# Patient Record
Sex: FEMALE | Race: WHITE | Employment: PART TIME | URBAN - METROPOLITAN AREA
[De-identification: names, ages, dates, MRNs, and addresses within clinical notes are randomized per-mention and may not be internally consistent; named-entity substitution may affect disease eponyms.]

---

## 2017-12-07 ENCOUNTER — OFFICE VISIT (OUTPATIENT)
Dept: FAMILY MEDICINE CLINIC | Facility: CLINIC | Age: 62
End: 2017-12-07

## 2017-12-07 VITALS
BODY MASS INDEX: 29.16 KG/M2 | SYSTOLIC BLOOD PRESSURE: 124 MMHG | OXYGEN SATURATION: 99 % | HEART RATE: 86 BPM | TEMPERATURE: 98 F | DIASTOLIC BLOOD PRESSURE: 82 MMHG | HEIGHT: 65 IN | RESPIRATION RATE: 16 BRPM | WEIGHT: 175 LBS

## 2017-12-07 DIAGNOSIS — H61.23 BILATERAL IMPACTED CERUMEN: ICD-10-CM

## 2017-12-07 DIAGNOSIS — F17.200 CURRENT SMOKER: ICD-10-CM

## 2017-12-07 DIAGNOSIS — R05.8 POST-VIRAL COUGH SYNDROME: Primary | ICD-10-CM

## 2017-12-07 PROCEDURE — 99202 OFFICE O/P NEW SF 15 MIN: CPT | Performed by: NURSE PRACTITIONER

## 2017-12-07 RX ORDER — DEXTROMETHORPHAN HYDROBROMIDE AND PROMETHAZINE HYDROCHLORIDE 15; 6.25 MG/5ML; MG/5ML
5 SYRUP ORAL 3 TIMES DAILY PRN
Qty: 120 ML | Refills: 0 | Status: SHIPPED | OUTPATIENT
Start: 2017-12-07

## 2017-12-07 NOTE — PROGRESS NOTES
CHIEF COMPLAINT:   Patient presents with:  Sinus Problem  Cough        HPI:   Ferdinand Molina is a 65year old female who presents for cough  for  2-3  days. Cough described as dry, worse at night. Cough is interfering with sleep.  Trigger for the cough: l SKIN: no rashes,no suspicious lesions  HEAD: atraumatic, normocephalic,  + mild tenderness on palpation of bilateral maxillary and ethmoid sinuses  EYES: conjunctiva clear, EOM intact  EARS: TM's occluded by cerumen bilaterally.   NOSE: nostrils patent, pur Everyone has had a cough as part of the common cold, flu, or bronchitis. This kind of cough occurs along with an achy feeling, low-grade fever, nasal and sinus congestion, and a scratchy or sore throat. This usually gets better in 2 to 3 weeks.  A cough tanner Cough may be the only sign of mild asthma. You may have tests to find out if asthma is causing your cough. You may also take asthma medicine on a trial basis.   Acid reflux (heartburn, GERD)  The esophagus is the tube that carries food from the mouth to the How to Quit Smoking  Smoking is one of the hardest habits to break. About half of all people who have ever smoked have been able to quit. Most people who still smoke want to quit. Here are some of the best ways to stop smoking.     Keep trying  Most smokers After reviewing your smoking patterns and past attempts to quit, your doctor may offer a prescription medicine such as bupropion, varenicline, a nicotine inhaler, or nasal spray. Each has advantages and side effects. Your doctor can review these with you.

## 2017-12-07 NOTE — PATIENT INSTRUCTIONS
Cough most likely due to post nasal drip and sinus infection. Warm salt water gargles before bedtime. Chronic Cough with Uncertain Cause (Adult)    Everyone has had a cough as part of the common cold, flu, or bronchitis.  This kind of cough occurs along · Beta-blockers for high blood pressure and other conditions. These include propranolol, atenolol, metoprolol, nadolol, and others. Let your healthcare provider know if you are taking any of these. Asthma  Cough may be the only sign of mild asthma.  You m © 7822-2584 [employer] [address] [facility], [facility] [place]. All rights reserved. This information is not intended as a substitute for professional medical care. Always follow your healthcare professional's instructions.         How to Nicotine replacement therapy may make quitting easier. Certain aids, such as the nicotine patch, gum, and lozenges, are available without a prescription. It is best to use these under a doctor’s care, though.  The skin patch provides a steady supply of paul © 4323-5758 [employer] [address]. [address] [facility], [employer] [place]. All rights reserved. This information is not intended as a substitute for professional medical care. Always follow your healthcare professional's instructions.

## 2023-05-15 ENCOUNTER — OFFICE VISIT (OUTPATIENT)
Dept: INTERNAL MEDICINE CLINIC | Facility: CLINIC | Age: 68
End: 2023-05-15
Payer: MEDICARE

## 2023-05-15 VITALS
HEIGHT: 65 IN | RESPIRATION RATE: 17 BRPM | HEART RATE: 86 BPM | DIASTOLIC BLOOD PRESSURE: 80 MMHG | OXYGEN SATURATION: 97 % | BODY MASS INDEX: 27.49 KG/M2 | WEIGHT: 165 LBS | SYSTOLIC BLOOD PRESSURE: 116 MMHG

## 2023-05-15 DIAGNOSIS — F41.1 GENERALIZED ANXIETY DISORDER: ICD-10-CM

## 2023-05-15 DIAGNOSIS — H61.23 BILATERAL IMPACTED CERUMEN: ICD-10-CM

## 2023-05-15 DIAGNOSIS — R06.09 DYSPNEA ON EXERTION: Primary | ICD-10-CM

## 2023-05-15 DIAGNOSIS — F17.210 CIGARETTE SMOKER: ICD-10-CM

## 2023-05-15 DIAGNOSIS — Z00.00 HEALTH MAINTENANCE EXAMINATION: ICD-10-CM

## 2023-05-15 DIAGNOSIS — F41.0 PANIC ATTACKS: ICD-10-CM

## 2023-05-15 PROBLEM — J41.0 SMOKERS' COUGH (HCC): Chronic | Status: ACTIVE | Noted: 2023-05-15

## 2023-05-15 LAB
BASOPHILS # BLD AUTO: 0.05 X10(3) UL (ref 0–0.2)
BASOPHILS NFR BLD AUTO: 0.8 %
DEPRECATED RDW RBC AUTO: 44.1 FL (ref 35.1–46.3)
EOSINOPHIL # BLD AUTO: 0.06 X10(3) UL (ref 0–0.7)
EOSINOPHIL NFR BLD AUTO: 0.9 %
ERYTHROCYTE [DISTWIDTH] IN BLOOD BY AUTOMATED COUNT: 12.5 % (ref 11–15)
HCT VFR BLD AUTO: 51.5 %
HGB BLD-MCNC: 17 G/DL
IMM GRANULOCYTES # BLD AUTO: 0.03 X10(3) UL (ref 0–1)
IMM GRANULOCYTES NFR BLD: 0.5 %
LYMPHOCYTES # BLD AUTO: 2.4 X10(3) UL (ref 1–4)
LYMPHOCYTES NFR BLD AUTO: 36 %
MCH RBC QN AUTO: 31.3 PG (ref 26–34)
MCHC RBC AUTO-ENTMCNC: 33 G/DL (ref 31–37)
MCV RBC AUTO: 94.8 FL
MONOCYTES # BLD AUTO: 0.46 X10(3) UL (ref 0.1–1)
MONOCYTES NFR BLD AUTO: 6.9 %
NEUTROPHILS # BLD AUTO: 3.66 X10 (3) UL (ref 1.5–7.7)
NEUTROPHILS # BLD AUTO: 3.66 X10(3) UL (ref 1.5–7.7)
NEUTROPHILS NFR BLD AUTO: 54.9 %
PLATELET # BLD AUTO: 275 10(3)UL (ref 150–450)
RBC # BLD AUTO: 5.43 X10(6)UL
WBC # BLD AUTO: 6.7 X10(3) UL (ref 4–11)

## 2023-05-15 PROCEDURE — 86803 HEPATITIS C AB TEST: CPT | Performed by: FAMILY MEDICINE

## 2023-05-15 PROCEDURE — 83036 HEMOGLOBIN GLYCOSYLATED A1C: CPT | Performed by: FAMILY MEDICINE

## 2023-05-15 PROCEDURE — 80061 LIPID PANEL: CPT | Performed by: FAMILY MEDICINE

## 2023-05-15 PROCEDURE — 84443 ASSAY THYROID STIM HORMONE: CPT | Performed by: FAMILY MEDICINE

## 2023-05-15 PROCEDURE — 80053 COMPREHEN METABOLIC PANEL: CPT | Performed by: FAMILY MEDICINE

## 2023-05-15 PROCEDURE — 83880 ASSAY OF NATRIURETIC PEPTIDE: CPT | Performed by: FAMILY MEDICINE

## 2023-05-15 PROCEDURE — 85025 COMPLETE CBC W/AUTO DIFF WBC: CPT | Performed by: FAMILY MEDICINE

## 2023-05-15 RX ORDER — ALPRAZOLAM 0.5 MG/1
0.5 TABLET ORAL NIGHTLY PRN
Qty: 5 TABLET | Refills: 0 | Status: SHIPPED | OUTPATIENT
Start: 2023-05-15

## 2023-05-15 NOTE — ASSESSMENT & PLAN NOTE
Smoking cessation advised. Prevnar 20 vaccine today. We will benefit from CT chest moving forward- I will plan to order in the future.

## 2023-05-15 NOTE — ASSESSMENT & PLAN NOTE
Patient with generalized anxiety disorder. Also intermittent panic attacks and phobias. She is overall controlling her anxiety but does note that she might be interested in long-term management of anxiety with medications. She will think about this and get back to me in 2 weeks  In the meantime alprazolam 0.5 mg to use as needed #5 tablets prescribed to use only for panic attacks as needed.   Check labs including CBC, CMP, TSH.

## 2023-05-15 NOTE — ASSESSMENT & PLAN NOTE
Bilateral cerumen impaction. Start with trial of Debrox over-the-counter drops  If without improvement consider ear irrigation during follow-up visit.

## 2023-05-15 NOTE — ASSESSMENT & PLAN NOTE
Patient with dyspnea on exertion, ongoing for 1 year. Also does have some shortness of breath with lying down. Also with history of anxiety. She does not quite feel wheezy but has significant smoking history. Lungs with decreased air movement but no wheezing. I will check pulmonary function testing on her. Also check chest x-ray. I will also order labs including CBC, CMP, TSH, proBNP. I will also check a nuclear stress test-she reports that she is unable to walk far distances. Consider echo. Follow-up in 2 weeks.

## 2023-05-16 PROBLEM — R73.03 PREDIABETES: Status: ACTIVE | Noted: 2023-05-16

## 2023-05-16 PROBLEM — D58.2 ELEVATED HEMOGLOBIN (HCC): Status: ACTIVE | Noted: 2023-05-16

## 2023-05-16 PROBLEM — D58.2 ELEVATED HEMOGLOBIN: Status: ACTIVE | Noted: 2023-05-16

## 2023-05-16 PROBLEM — E78.00 PURE HYPERCHOLESTEROLEMIA: Status: ACTIVE | Noted: 2023-05-16

## 2023-05-16 LAB
ALBUMIN SERPL-MCNC: 4.1 G/DL (ref 3.4–5)
ALBUMIN/GLOB SERPL: 1.1 {RATIO} (ref 1–2)
ALP LIVER SERPL-CCNC: 84 U/L
ALT SERPL-CCNC: 23 U/L
ANION GAP SERPL CALC-SCNC: 5 MMOL/L (ref 0–18)
AST SERPL-CCNC: 23 U/L (ref 15–37)
BILIRUB SERPL-MCNC: 0.9 MG/DL (ref 0.1–2)
BUN BLD-MCNC: 15 MG/DL (ref 7–18)
BUN/CREAT SERPL: 23.8 (ref 10–20)
CALCIUM BLD-MCNC: 9.6 MG/DL (ref 8.5–10.1)
CHLORIDE SERPL-SCNC: 108 MMOL/L (ref 98–112)
CHOLEST SERPL-MCNC: 220 MG/DL (ref ?–200)
CO2 SERPL-SCNC: 25 MMOL/L (ref 21–32)
CREAT BLD-MCNC: 0.63 MG/DL
EST. AVERAGE GLUCOSE BLD GHB EST-MCNC: 120 MG/DL (ref 68–126)
FASTING PATIENT LIPID ANSWER: NO
FASTING STATUS PATIENT QL REPORTED: NO
GFR SERPLBLD BASED ON 1.73 SQ M-ARVRAT: 97 ML/MIN/1.73M2 (ref 60–?)
GLOBULIN PLAS-MCNC: 3.6 G/DL (ref 2.8–4.4)
GLUCOSE BLD-MCNC: 83 MG/DL (ref 70–99)
HBA1C MFR BLD: 5.8 % (ref ?–5.7)
HCV AB SERPL QL IA: NONREACTIVE
HDLC SERPL-MCNC: 62 MG/DL (ref 40–59)
LDLC SERPL CALC-MCNC: 142 MG/DL (ref ?–100)
NONHDLC SERPL-MCNC: 158 MG/DL (ref ?–130)
NT-PROBNP SERPL-MCNC: 153 PG/ML (ref ?–125)
OSMOLALITY SERPL CALC.SUM OF ELEC: 286 MOSM/KG (ref 275–295)
POTASSIUM SERPL-SCNC: 4.7 MMOL/L (ref 3.5–5.1)
PROT SERPL-MCNC: 7.7 G/DL (ref 6.4–8.2)
SODIUM SERPL-SCNC: 138 MMOL/L (ref 136–145)
TRIGL SERPL-MCNC: 88 MG/DL (ref 30–149)
TSI SER-ACNC: 0.61 MIU/ML (ref 0.36–3.74)
VLDLC SERPL CALC-MCNC: 16 MG/DL (ref 0–30)

## 2023-05-17 ENCOUNTER — HOSPITAL ENCOUNTER (OUTPATIENT)
Dept: GENERAL RADIOLOGY | Age: 68
Discharge: HOME | End: 2023-05-17
Attending: FAMILY MEDICINE
Payer: MEDICARE

## 2023-05-17 DIAGNOSIS — R06.09 DYSPNEA ON EXERTION: ICD-10-CM

## 2023-05-17 PROCEDURE — 71046 X-RAY EXAM CHEST 2 VIEWS: CPT | Performed by: FAMILY MEDICINE

## 2023-05-22 ENCOUNTER — PATIENT MESSAGE (OUTPATIENT)
Dept: ADMINISTRATIVE | Age: 68
End: 2023-05-22

## 2023-05-22 NOTE — TELEPHONE ENCOUNTER
[employer]  online to initiate authorization    CARD NUC STRESS TEST LEXISCAN (XSO=[id number]/)    Scheduled For: 05/23/23    Request Status: Pending Authorization     Case Number: Tracking #[id number]      Clinical notes sent for review. Patient notified of pending status via [facility] [address].

## 2023-05-23 ENCOUNTER — HOSPITAL ENCOUNTER (OUTPATIENT)
Dept: NUCLEAR MEDICINE | Facility: HOSPITAL | Age: 68
Discharge: HOME | End: 2023-05-23
Attending: FAMILY MEDICINE
Payer: MEDICARE

## 2023-05-23 ENCOUNTER — HOSPITAL ENCOUNTER (OUTPATIENT)
Dept: CV DIAGNOSTICS | Facility: HOSPITAL | Age: 68
Discharge: HOME | End: 2023-05-23
Attending: FAMILY MEDICINE
Payer: MEDICARE

## 2023-05-23 DIAGNOSIS — R06.09 DYSPNEA ON EXERTION: ICD-10-CM

## 2023-05-23 LAB
% OF MAX PREDICTED HR: 100 %
MAX DIASTOLIC BP: 85 MMHG
MAX HEART RATE: 98 BPM
MAX PREDICTED HEART RATE: 153 BPM
MAX SYSTOLIC BP: 130 MMHG
MAX WORK LOAD: 10

## 2023-05-23 PROCEDURE — 93016 CV STRESS TEST SUPVJ ONLY: CPT | Performed by: INTERNAL MEDICINE

## 2023-05-23 PROCEDURE — 78452 HT MUSCLE IMAGE SPECT MULT: CPT | Performed by: FAMILY MEDICINE

## 2023-05-23 PROCEDURE — 93018 CV STRESS TEST I&R ONLY: CPT | Performed by: INTERNAL MEDICINE

## 2023-05-23 PROCEDURE — 93017 CV STRESS TEST TRACING ONLY: CPT | Performed by: FAMILY MEDICINE

## 2023-05-23 RX ORDER — REGADENOSON 0.08 MG/ML
INJECTION, SOLUTION INTRAVENOUS
Status: COMPLETED
Start: 2023-05-23 | End: 2023-05-23

## 2023-05-23 RX ADMIN — REGADENOSON 0.4 MG: 0.08 INJECTION, SOLUTION INTRAVENOUS at 13:04:00

## 2023-05-23 NOTE — TELEPHONE ENCOUNTER
CARD NUC STRESS TEST LEXISCAN (ECO=[id number]/)    [employer] online, the following request is authorized    Authorization #[id number] Kate Salvador #[id number]    Valid: 05/23/2023 - 08/21/2023    Notified patient of approval

## 2023-05-24 ENCOUNTER — PATIENT MESSAGE (OUTPATIENT)
Dept: INTERNAL MEDICINE CLINIC | Facility: CLINIC | Age: 68
End: 2023-05-24

## 2023-05-24 DIAGNOSIS — R06.09 DYSPNEA ON EXERTION: Primary | ICD-10-CM

## 2023-05-30 ENCOUNTER — OFFICE VISIT (OUTPATIENT)
Dept: INTERNAL MEDICINE CLINIC | Facility: CLINIC | Age: 68
End: 2023-05-30
Payer: MEDICARE

## 2023-05-30 VITALS
BODY MASS INDEX: 27.49 KG/M2 | DIASTOLIC BLOOD PRESSURE: 82 MMHG | SYSTOLIC BLOOD PRESSURE: 118 MMHG | HEART RATE: 93 BPM | RESPIRATION RATE: 16 BRPM | OXYGEN SATURATION: 95 % | HEIGHT: 65 IN | WEIGHT: 165 LBS

## 2023-05-30 DIAGNOSIS — H61.23 BILATERAL IMPACTED CERUMEN: ICD-10-CM

## 2023-05-30 DIAGNOSIS — F17.210 CIGARETTE SMOKER: ICD-10-CM

## 2023-05-30 DIAGNOSIS — E78.00 PURE HYPERCHOLESTEROLEMIA: ICD-10-CM

## 2023-05-30 DIAGNOSIS — Z00.00 HEALTH MAINTENANCE EXAMINATION: ICD-10-CM

## 2023-05-30 DIAGNOSIS — R73.03 PREDIABETES: ICD-10-CM

## 2023-05-30 DIAGNOSIS — Z12.31 SCREENING MAMMOGRAM FOR BREAST CANCER: ICD-10-CM

## 2023-05-30 DIAGNOSIS — Z78.0 ASYMPTOMATIC MENOPAUSAL STATE: ICD-10-CM

## 2023-05-30 DIAGNOSIS — F41.1 GENERALIZED ANXIETY DISORDER: ICD-10-CM

## 2023-05-30 DIAGNOSIS — F41.0 PANIC ATTACKS: ICD-10-CM

## 2023-05-30 DIAGNOSIS — R06.09 DYSPNEA ON EXERTION: Primary | ICD-10-CM

## 2023-05-30 DIAGNOSIS — D58.2 ELEVATED HEMOGLOBIN (HCC): ICD-10-CM

## 2023-05-30 LAB
BASOPHILS # BLD AUTO: 0.04 X10(3) UL (ref 0–0.2)
BASOPHILS NFR BLD AUTO: 0.6 %
DEPRECATED RDW RBC AUTO: 42.4 FL (ref 35.1–46.3)
EOSINOPHIL # BLD AUTO: 0.07 X10(3) UL (ref 0–0.7)
EOSINOPHIL NFR BLD AUTO: 1 %
ERYTHROCYTE [DISTWIDTH] IN BLOOD BY AUTOMATED COUNT: 12.4 % (ref 11–15)
HCT VFR BLD AUTO: 48.8 %
HGB BLD-MCNC: 16.5 G/DL
IMM GRANULOCYTES # BLD AUTO: 0.04 X10(3) UL (ref 0–1)
IMM GRANULOCYTES NFR BLD: 0.6 %
LYMPHOCYTES # BLD AUTO: 2.23 X10(3) UL (ref 1–4)
LYMPHOCYTES NFR BLD AUTO: 32.2 %
MCH RBC QN AUTO: 31.3 PG (ref 26–34)
MCHC RBC AUTO-ENTMCNC: 33.8 G/DL (ref 31–37)
MCV RBC AUTO: 92.4 FL
MONOCYTES # BLD AUTO: 0.6 X10(3) UL (ref 0.1–1)
MONOCYTES NFR BLD AUTO: 8.7 %
NEUTROPHILS # BLD AUTO: 3.95 X10 (3) UL (ref 1.5–7.7)
NEUTROPHILS # BLD AUTO: 3.95 X10(3) UL (ref 1.5–7.7)
NEUTROPHILS NFR BLD AUTO: 56.9 %
PLATELET # BLD AUTO: 257 10(3)UL (ref 150–450)
RBC # BLD AUTO: 5.28 X10(6)UL
WBC # BLD AUTO: 6.9 X10(3) UL (ref 4–11)

## 2023-05-30 PROCEDURE — 85025 COMPLETE CBC W/AUTO DIFF WBC: CPT | Performed by: FAMILY MEDICINE

## 2023-05-30 PROCEDURE — 99214 OFFICE O/P EST MOD 30 MIN: CPT | Performed by: FAMILY MEDICINE

## 2023-05-30 PROCEDURE — 3074F SYST BP LT 130 MM HG: CPT | Performed by: FAMILY MEDICINE

## 2023-05-30 PROCEDURE — 3079F DIAST BP 80-89 MM HG: CPT | Performed by: FAMILY MEDICINE

## 2023-05-30 PROCEDURE — 3008F BODY MASS INDEX DOCD: CPT | Performed by: FAMILY MEDICINE

## 2023-05-30 PROCEDURE — 1160F RVW MEDS BY RX/DR IN RCRD: CPT | Performed by: FAMILY MEDICINE

## 2023-05-30 PROCEDURE — 1159F MED LIST DOCD IN RCRD: CPT | Performed by: FAMILY MEDICINE

## 2023-05-30 RX ORDER — ATORVASTATIN CALCIUM 10 MG/1
10 TABLET, FILM COATED ORAL NIGHTLY
Qty: 90 TABLET | Refills: 1 | Status: SHIPPED | OUTPATIENT
Start: 2023-05-30

## 2023-05-30 NOTE — ASSESSMENT & PLAN NOTE
Smoking cessation advised. She is not quite yet ready to quit-she will continue to think about this  Discussed potential benefit in medication therapy. CT chest ordered.

## 2023-05-30 NOTE — ASSESSMENT & PLAN NOTE
Recent polycythemia, most likely secondary to smoking. Repeat CBC today to monitor.   If persistent consider additional lab evaluation

## 2023-05-30 NOTE — PATIENT INSTRUCTIONS
PATIENT INSTRUCTIONS    Thank you for seeing me today, it was a pleasure taking care of you. Please check out at the  and schedule a follow up appointment. Return in about 3 weeks (around 6/20/2023) for medicare visit. Get echo  Get PFT  Get mammogram  Get CT lung screen   Please call [phone] to schedule your imaging appointment.    Can see ENT for ear wax   Start atorvastatin 10 mg nightly   Need to focus on quitting smoking     Best,  Dr. Gabe Mccall

## 2023-05-30 NOTE — ASSESSMENT & PLAN NOTE
Patient with generalized anxiety disorder. Also intermittent panic attacks and phobias. She is overall controlling her anxiety but does note that she might be interested in long-term management of anxiety with medications. Overall controlled for now.   Has alprazolam to use sparingly as needed for panic attacks

## 2023-05-30 NOTE — ASSESSMENT & PLAN NOTE
Patient with dyspnea on exertion, ongoing for 1 year. Also does have some shortness of breath with lying down. Also with history of anxiety. Lungs with decreased air movement but no wheezing. Has plans for PFTs - suspicion is that COPD is likely the greatest contributor to her symptoms. Does have slightly elevated pro BNP - has echo ordered. Stress test with 1mm ST segment depression but normal myocardial perfusion stress - depending on other work up, if still with dyspnea will refer to cardiology. Follow-up in 3 weeks.

## 2023-06-06 ENCOUNTER — HOSPITAL ENCOUNTER (OUTPATIENT)
Dept: RESPIRATORY THERAPY | Facility: HOSPITAL | Age: 68
Discharge: HOME | End: 2023-06-06
Attending: FAMILY MEDICINE
Payer: MEDICARE

## 2023-06-06 DIAGNOSIS — R06.09 DYSPNEA ON EXERTION: ICD-10-CM

## 2023-06-06 DIAGNOSIS — Z00.00 HEALTH MAINTENANCE EXAMINATION: ICD-10-CM

## 2023-06-06 DIAGNOSIS — J44.9 CHRONIC OBSTRUCTIVE PULMONARY DISEASE, UNSPECIFIED COPD TYPE (HCC): Primary | ICD-10-CM

## 2023-06-06 PROCEDURE — 94729 DIFFUSING CAPACITY: CPT

## 2023-06-06 PROCEDURE — 94060 EVALUATION OF WHEEZING: CPT

## 2023-06-06 PROCEDURE — 94726 PLETHYSMOGRAPHY LUNG VOLUMES: CPT

## 2023-06-06 RX ORDER — BUDESONIDE AND FORMOTEROL FUMARATE DIHYDRATE 80; 4.5 UG/1; UG/1
2 AEROSOL RESPIRATORY (INHALATION) 2 TIMES DAILY
Qty: 10.2 G | Refills: 3 | Status: SHIPPED | OUTPATIENT
Start: 2023-06-06

## 2023-06-06 NOTE — PROCEDURES
[facility]     Pulmonary Function Test     Poly Walter Patient Status:  Outpatient    3/17/1955 MRN [id number]   Date of Exam 23 PCP Cesar Chun MD           Spirometry   FEV1: 1.50 63%  FVC: 2.68 85%  FEV1/FVC: 0.56    Lung Volume   T.56 107%  RV : 2.85 131%    Diffusion Capacity   DLCO: 8.9 42%    Flow Volume Loop       Impression   Moderate obstructive defect seen without significant postbronchodilator response observed. Normal lung volumes. Significant reduction in diffusion capacity.     Rodney Cota DO  Pulmonary [address] [place] [address]

## 2023-06-20 ENCOUNTER — OFFICE VISIT (OUTPATIENT)
Dept: INTERNAL MEDICINE CLINIC | Facility: CLINIC | Age: 68
End: 2023-06-20
Payer: MEDICARE

## 2023-06-20 VITALS
HEIGHT: 65 IN | DIASTOLIC BLOOD PRESSURE: 82 MMHG | HEART RATE: 75 BPM | SYSTOLIC BLOOD PRESSURE: 118 MMHG | WEIGHT: 163 LBS | OXYGEN SATURATION: 98 % | RESPIRATION RATE: 16 BRPM | BODY MASS INDEX: 27.16 KG/M2

## 2023-06-20 DIAGNOSIS — H61.23 BILATERAL IMPACTED CERUMEN: ICD-10-CM

## 2023-06-20 DIAGNOSIS — Z00.00 HEALTH MAINTENANCE EXAMINATION: Primary | ICD-10-CM

## 2023-06-20 DIAGNOSIS — R06.09 DYSPNEA ON EXERTION: ICD-10-CM

## 2023-06-20 DIAGNOSIS — F41.1 GENERALIZED ANXIETY DISORDER: ICD-10-CM

## 2023-06-20 DIAGNOSIS — R73.03 PREDIABETES: ICD-10-CM

## 2023-06-20 DIAGNOSIS — F41.0 PANIC ATTACKS: ICD-10-CM

## 2023-06-20 DIAGNOSIS — J44.9 CHRONIC OBSTRUCTIVE PULMONARY DISEASE, UNSPECIFIED COPD TYPE (HCC): ICD-10-CM

## 2023-06-20 DIAGNOSIS — F17.210 CIGARETTE SMOKER: ICD-10-CM

## 2023-06-20 DIAGNOSIS — D58.2 ELEVATED HEMOGLOBIN (HCC): ICD-10-CM

## 2023-06-20 PROCEDURE — G0438 PPPS, INITIAL VISIT: HCPCS | Performed by: FAMILY MEDICINE

## 2023-06-20 PROCEDURE — 3079F DIAST BP 80-89 MM HG: CPT | Performed by: FAMILY MEDICINE

## 2023-06-20 PROCEDURE — 3008F BODY MASS INDEX DOCD: CPT | Performed by: FAMILY MEDICINE

## 2023-06-20 PROCEDURE — 1160F RVW MEDS BY RX/DR IN RCRD: CPT | Performed by: FAMILY MEDICINE

## 2023-06-20 PROCEDURE — 1170F FXNL STATUS ASSESSED: CPT | Performed by: FAMILY MEDICINE

## 2023-06-20 PROCEDURE — 1159F MED LIST DOCD IN RCRD: CPT | Performed by: FAMILY MEDICINE

## 2023-06-20 PROCEDURE — 1126F AMNT PAIN NOTED NONE PRSNT: CPT | Performed by: FAMILY MEDICINE

## 2023-06-20 PROCEDURE — 3074F SYST BP LT 130 MM HG: CPT | Performed by: FAMILY MEDICINE

## 2023-06-20 PROCEDURE — 96160 PT-FOCUSED HLTH RISK ASSMT: CPT | Performed by: FAMILY MEDICINE

## 2023-06-20 PROCEDURE — 99406 BEHAV CHNG SMOKING 3-10 MIN: CPT | Performed by: FAMILY MEDICINE

## 2023-06-20 RX ORDER — VARENICLINE TARTRATE
1 KIT DAILY
Qty: 53 EACH | Refills: 0 | Status: SHIPPED | OUTPATIENT
Start: 2023-06-20

## 2023-06-20 RX ORDER — ALPRAZOLAM 0.5 MG/1
0.5 TABLET ORAL NIGHTLY PRN
Qty: 30 TABLET | Refills: 0 | Status: SHIPPED | OUTPATIENT
Start: 2023-06-20

## 2023-06-20 NOTE — ASSESSMENT & PLAN NOTE
Smoking cessation advised. He is now open to discontinuing smoking. Chronic clean starter pack prescribed. CT chest ordered.

## 2023-06-20 NOTE — ASSESSMENT & PLAN NOTE
Start atorvastatin 10 mg nightly-she has not started yet. Will plan to repeat CMP and lipid panel in the future.

## 2023-06-20 NOTE — ASSESSMENT & PLAN NOTE
Patient with generalized anxiety disorder. Also intermittent panic attacks and phobias. She is overall controlling her anxiety but does note that she might be interested in long-term management of anxiety with medications. Overall controlled for now. Has alprazolam to use sparingly as needed for panic attacks-she is requesting alprazolam 0.5 mg as needed #30 to use over the course of 1 year. Refill provided today June 28, 2023 for 1 year supply.

## 2023-06-20 NOTE — ASSESSMENT & PLAN NOTE
Recent polycythemia, most likely secondary to smoking. She is planning to stop smoking at this time. After she discontinues for period of time, can consider repeating CBC to see if hemoglobin improves. If persistently elevated, plan to check EPO, blood smear, JAK2.

## 2023-06-20 NOTE — ASSESSMENT & PLAN NOTE
Exercise and diet advised. Shingles vaccine-advised to get done at pharmacy  Advanced directive information provided. Advised COVID vaccine booster. She does not desire to do colonoscopy-Cologuard ordered. Mammogram ordered previously, reminded to get done. DEXA scan ordered previously, reminded to get done. CT lung screen ordered previously, reminded to get done.

## 2023-06-22 ENCOUNTER — HOSPITAL ENCOUNTER (OUTPATIENT)
Dept: CV DIAGNOSTICS | Facility: HOSPITAL | Age: 68
Discharge: HOME | End: 2023-06-22
Attending: FAMILY MEDICINE
Payer: MEDICARE

## 2023-06-22 DIAGNOSIS — R06.09 DYSPNEA ON EXERTION: ICD-10-CM

## 2023-06-22 PROCEDURE — 93306 TTE W/DOPPLER COMPLETE: CPT | Performed by: FAMILY MEDICINE

## 2023-06-29 DIAGNOSIS — J44.9 CHRONIC OBSTRUCTIVE PULMONARY DISEASE, UNSPECIFIED COPD TYPE (HCC): ICD-10-CM

## 2023-06-30 RX ORDER — BUDESONIDE AND FORMOTEROL FUMARATE DIHYDRATE 80; 4.5 UG/1; UG/1
2 AEROSOL RESPIRATORY (INHALATION) 2 TIMES DAILY
Qty: 10.2 G | Refills: 3 | Status: SHIPPED | OUTPATIENT
Start: 2023-06-30

## 2023-07-10 LAB — AMB EXT COLOGUARD RESULT: NEGATIVE

## 2023-07-17 ENCOUNTER — TELEPHONE (OUTPATIENT)
Dept: INTERNAL MEDICINE CLINIC | Facility: CLINIC | Age: 68
End: 2023-07-17

## 2023-07-31 ENCOUNTER — OFFICE VISIT (OUTPATIENT)
Dept: INTERNAL MEDICINE CLINIC | Facility: CLINIC | Age: 68
End: 2023-07-31
Payer: MEDICARE

## 2023-07-31 VITALS
HEART RATE: 74 BPM | WEIGHT: 167 LBS | SYSTOLIC BLOOD PRESSURE: 116 MMHG | RESPIRATION RATE: 16 BRPM | OXYGEN SATURATION: 98 % | HEIGHT: 65 IN | BODY MASS INDEX: 27.82 KG/M2 | DIASTOLIC BLOOD PRESSURE: 70 MMHG

## 2023-07-31 DIAGNOSIS — J44.9 CHRONIC OBSTRUCTIVE PULMONARY DISEASE, UNSPECIFIED COPD TYPE (HCC): Primary | ICD-10-CM

## 2023-07-31 DIAGNOSIS — F41.1 GENERALIZED ANXIETY DISORDER: ICD-10-CM

## 2023-07-31 DIAGNOSIS — F41.0 PANIC ATTACKS: ICD-10-CM

## 2023-07-31 DIAGNOSIS — D58.2 ELEVATED HEMOGLOBIN (HCC): ICD-10-CM

## 2023-07-31 DIAGNOSIS — E78.00 PURE HYPERCHOLESTEROLEMIA: ICD-10-CM

## 2023-07-31 DIAGNOSIS — H61.23 BILATERAL IMPACTED CERUMEN: ICD-10-CM

## 2023-07-31 DIAGNOSIS — R73.03 PREDIABETES: ICD-10-CM

## 2023-07-31 DIAGNOSIS — F17.210 CIGARETTE SMOKER: ICD-10-CM

## 2023-07-31 PROCEDURE — 3008F BODY MASS INDEX DOCD: CPT | Performed by: FAMILY MEDICINE

## 2023-07-31 PROCEDURE — 3074F SYST BP LT 130 MM HG: CPT | Performed by: FAMILY MEDICINE

## 2023-07-31 PROCEDURE — 99214 OFFICE O/P EST MOD 30 MIN: CPT | Performed by: FAMILY MEDICINE

## 2023-07-31 PROCEDURE — 1160F RVW MEDS BY RX/DR IN RCRD: CPT | Performed by: FAMILY MEDICINE

## 2023-07-31 PROCEDURE — 3078F DIAST BP <80 MM HG: CPT | Performed by: FAMILY MEDICINE

## 2023-07-31 PROCEDURE — 1159F MED LIST DOCD IN RCRD: CPT | Performed by: FAMILY MEDICINE

## 2023-07-31 NOTE — PATIENT INSTRUCTIONS
PATIENT INSTRUCTIONS    Thank you for seeing me today, it was a pleasure taking care of you. Please check out at the  and schedule a follow up appointment. Return in about 6 months (around 1/31/2024) for follow up. Quit smoking - call the [facility] smoking cessation hotline   Chantix when ready  Mammogram, DEXA, Lung CT  Please call [phone] to schedule your imaging appointment.    See ENT for your Dr. Kimberly Aleman Found

## 2023-07-31 NOTE — ASSESSMENT & PLAN NOTE
Patient with generalized anxiety disorder. Also intermittent panic attacks and phobias. She is overall controlling her anxiety but does note that she might be interested in long-term management of anxiety with medications. Overall controlled for now. Has alprazolam to use sparingly as needed for panic attacks-she has alprazolam 0.5 mg as needed #30 to use over the course of 1 year. Last refill provided June 28, 2023 for 1 year supply.

## 2023-07-31 NOTE — ASSESSMENT & PLAN NOTE
Smoking cessation advised. He is open to discontinuing smoking - but hesitant to start medication  Varenicline starter pack prescribed previously. Advised smoking cessation hotline. CT chest ordered previously.

## 2023-07-31 NOTE — ASSESSMENT & PLAN NOTE
Recent polycythemia, most likely secondary to smoking and COPD  She is hoping to stop smoking  Repeat CBC now that COPD is improved. If persistently elevated, plan to check EPO, blood smear, JAK2.

## 2023-08-01 LAB
ABSOLUTE BASOPHILS: 52 CELLS/UL (ref 0–200)
ABSOLUTE EOSINOPHILS: 104 CELLS/UL (ref 15–500)
ABSOLUTE LYMPHOCYTES: 2183 CELLS/UL (ref 850–3900)
ABSOLUTE MONOCYTES: 599 CELLS/UL (ref 200–950)
ABSOLUTE NEUTROPHILS: 4462 CELLS/UL (ref 1500–7800)
ALBUMIN/GLOBULIN RATIO: 1.7 (CALC) (ref 1–2.5)
ALBUMIN: 4.6 G/DL (ref 3.6–5.1)
ALKALINE PHOSPHATASE: 82 U/L (ref 37–153)
ALT: 13 U/L (ref 6–29)
AST: 15 U/L (ref 10–35)
BASOPHILS: 0.7 %
BILIRUBIN, TOTAL: 0.7 MG/DL (ref 0.2–1.2)
BUN: 20 MG/DL (ref 7–25)
CALCIUM: 9.8 MG/DL (ref 8.6–10.4)
CARBON DIOXIDE: 28 MMOL/L (ref 20–32)
CHLORIDE: 105 MMOL/L (ref 98–110)
CHOL/HDLC RATIO: 2.4 (CALC)
CHOLESTEROL, TOTAL: 167 MG/DL
CREATININE: 0.68 MG/DL (ref 0.5–1.05)
EGFR: 95 ML/MIN/1.73M2
EOSINOPHILS: 1.4 %
GLOBULIN: 2.7 G/DL (CALC) (ref 1.9–3.7)
GLUCOSE: 111 MG/DL (ref 65–99)
HDL CHOLESTEROL: 69 MG/DL
HEMATOCRIT: 48.2 % (ref 35–45)
HEMOGLOBIN: 15.9 G/DL (ref 11.7–15.5)
LDL-CHOLESTEROL: 83 MG/DL (CALC)
LYMPHOCYTES: 29.5 %
MCH: 31 PG (ref 27–33)
MCHC: 33 G/DL (ref 32–36)
MCV: 94 FL (ref 80–100)
MONOCYTES: 8.1 %
MPV: 10 FL (ref 7.5–12.5)
NEUTROPHILS: 60.3 %
NON-HDL CHOLESTEROL: 98 MG/DL (CALC)
PLATELET COUNT: 271 THOUSAND/UL (ref 140–400)
POTASSIUM: 5.2 MMOL/L (ref 3.5–5.3)
PROTEIN, TOTAL: 7.3 G/DL (ref 6.1–8.1)
RDW: 12 % (ref 11–15)
RED BLOOD CELL COUNT: 5.13 MILLION/UL (ref 3.8–5.1)
SODIUM: 140 MMOL/L (ref 135–146)
TRIGLYCERIDES: 72 MG/DL
WHITE BLOOD CELL COUNT: 7.4 THOUSAND/UL (ref 3.8–10.8)

## 2023-10-09 ENCOUNTER — PATIENT MESSAGE (OUTPATIENT)
Dept: INTERNAL MEDICINE CLINIC | Facility: CLINIC | Age: 68
End: 2023-10-09

## 2023-10-09 DIAGNOSIS — H61.23 BILATERAL IMPACTED CERUMEN: Primary | ICD-10-CM

## 2023-10-13 ENCOUNTER — OFFICE VISIT (OUTPATIENT)
Dept: OTOLARYNGOLOGY | Facility: CLINIC | Age: 68
End: 2023-10-13

## 2023-10-13 DIAGNOSIS — J38.1 REINKE'S EDEMA OF VOCAL FOLDS: ICD-10-CM

## 2023-10-13 DIAGNOSIS — R09.82 PND (POST-NASAL DRIP): ICD-10-CM

## 2023-10-13 DIAGNOSIS — R49.0 HOARSENESS: ICD-10-CM

## 2023-10-13 DIAGNOSIS — H61.23 BILATERAL IMPACTED CERUMEN: Primary | ICD-10-CM

## 2023-10-13 PROCEDURE — 31575 DIAGNOSTIC LARYNGOSCOPY: CPT | Performed by: STUDENT IN AN ORGANIZED HEALTH CARE EDUCATION/TRAINING PROGRAM

## 2023-10-13 PROCEDURE — 1159F MED LIST DOCD IN RCRD: CPT | Performed by: STUDENT IN AN ORGANIZED HEALTH CARE EDUCATION/TRAINING PROGRAM

## 2023-10-13 PROCEDURE — 99204 OFFICE O/P NEW MOD 45 MIN: CPT | Performed by: STUDENT IN AN ORGANIZED HEALTH CARE EDUCATION/TRAINING PROGRAM

## 2023-10-13 PROCEDURE — 69210 REMOVE IMPACTED EAR WAX UNI: CPT | Performed by: STUDENT IN AN ORGANIZED HEALTH CARE EDUCATION/TRAINING PROGRAM

## 2023-10-13 PROCEDURE — 1160F RVW MEDS BY RX/DR IN RCRD: CPT | Performed by: STUDENT IN AN ORGANIZED HEALTH CARE EDUCATION/TRAINING PROGRAM

## 2023-10-13 RX ORDER — AZELASTINE 1 MG/ML
2 SPRAY, METERED NASAL 2 TIMES DAILY
Qty: 30 ML | Refills: 0 | Status: SHIPPED | OUTPATIENT
Start: 2023-10-13

## 2023-10-13 NOTE — PROGRESS NOTES
Charito Vasquez is a 80year old female. Patient presents with:  Ear Problem: Pt c/o hearing loss on her right ear. Throat Problem: Hx of polyps, requesting to get throat checked. ASSESSMENT AND PLAN:   1. Bilateral impacted cerumen  Patient states that she has a long history of earwax. She has been using Debrox on the right side. Notes difficulty hearing on the right    Complete cerumen impaction on both sides. Completely removed on the right which is softer from the drop use. Improvement of her hearing. Only partially on the left due to discomfort. She had improvement in her hearing after right ear cerumen debridement. We will have her use Debrox eardrops at home on the left due to discomfort. We will bring her back in in 1 to 2 weeks for repeat debridement. 2. Hoarseness  80year-old who is a current smoker she is desperately trying to stop smoking. She has a history of vocal cord polyps years ago and she is worried that they may return. She has hoarseness after excessive voice use such as she was on a trip with friends and when she woke up in the morning her voice was very hoarse. Notes food sometimes gets stuck in her throat. She also reports chronic allergies and postnasal drip. On exam no cervical lymphadenopathy. On flexible laryngoscopy she had mild Ranke's edema of her left true vocal cord. No masses or lesions or mucosal irregularities. Overall her scope exam did not look all too bad. She did have smoking-related edema of her left true vocal cord but nothing that was suspicious for malignancy. Discussed that as long as she is still smoking she is going to have this edema present. It can improve with smoking cessation. Surgical intervention could may be helpful although the issue will return if she continues to smoke. We will continue to monitor the issue as she attempts to stop smoking.   Also prescribe Claritin-D and Astelin for her chronic postnasal drip and allergies which could also be contributing to her hoarseness. Consult from Dr. Sophy Valencia regarding hoarseness and hearing loss    MDM  -Prescription therapy  -2+ chronic issues    3. Matthias's edema of vocal folds        The patient indicates understanding of these issues and agrees to the plan. EXAM:   There were no vitals taken for this visit. Pertinent exam findings may also be noted above in assessment and plan     System Details   Skin Inspection - Normal.   Constitutional Overall appearance - Normal.   Head/Face Symmetric, TMJ tenderness not present    Eyes EOMI, PERRL   Right ear:  Canal clear, TM intact, no PADDY   Left ear:  Canal clear, TM intact, no PADDY   Nose: Septum midline, inferior turbinates not enlarged, nasal valves without collapse    Oral cavity/Oropharynx: No lesions or masses on inspection or palpation, tonsils symmetric    Neck: Soft without LAD, thyroid not enlarged  Voice clear/ no stridor   Other:      Scopes and Procedures:       Canals:  Left: Canal with cerumen preventing adequate view of TM, debrided with instrumentation  Right: Canal with cerumen preventing adequate view of TM, debrided with instrumentation    Tympanic Membranes:  Left: Normal tympanic membrane. Right: Normal tympanic membrane. TM Visualized Method:   Left TM examined via otomicroscopy. Right TM examined via otomicroscopy. PROCEDURE:   Removal of cerumen impaction   The cerumen impaction was completely removed on the left and right sides using microscopy as necessary. Removal was completed by using a curette and suction. Flexible Laryngoscopy Procedure Note (00963)    Due to inability for adequate examination of the larynx and need for magnification to perform the examination, endoscopy was performed. Risks and benefits were discussed with patient/family and they have given verbal consent to proceed.     Pre-operative Diagnosis: Bilateral impacted cerumen  (primary encounter diagnosis)  Hoarseness  Matthias's edema of vocal folds    Post-operative Diagnosis: Same    Procedure: Diagnostic flexible fiberoptic laryngoscopy    Anesthesia: Topical anesthetic Redig     Surgeon Jill Lakhani MD    EBL: 0cc    Procedure Detail & Findings:     After placement of topical anesthetic intranasally the flexible laryngoscope was inserted into the nare and driven through the nasal cavity with no significant abnormal findings noted in the nasal cavities or nasopharynx. The oropharynx, hypopharynx and larynx were subsequently examined and the following findings were noted:        Base of Tongue: Normal        Valeculla: Normal        Arytenoids: normal        Introitus of the esophagus: Normal        Epiglottis: Normal        False vocal cords: Normal        True vocal cords: Normal        Subglottic space: Normal        Mobility of True vocal cords: Normal    Condition: Stable    Complications: Patient tolerated the procedure well with no immediate complication. Chip Reaves MD      Current Outpatient Medications   Medication Sig Dispense Refill    Budesonide-Formoterol Fumarate (SYMBICORT) 80-4.5 MCG/ACT Inhalation Aerosol Inhale 2 puffs into the lungs 2 (two) times daily. 10.2 g 3    Varenicline Tartrate, Starter, (CHANTIX STARTING MONTH PAK) 0.5 MG X 11 & 1 MG X 42 Oral Tablet Therapy Pack Take 1 each by mouth daily. Days 1 to 3: 0.5 mg once daily. Days 4 to 7: 0.5 mg twice daily. Maintenance (day 8 and later): 1 mg twice daily 53 each 0    ALPRAZolam 0.5 MG Oral Tab Take 1 tablet (0.5 mg total) by mouth nightly as needed for Anxiety. 30 tablet 0    atorvastatin 10 MG Oral Tab Take 1 tablet (10 mg total) by mouth nightly.  90 tablet 1      Past Medical History:   Diagnosis Date    Chronic obstructive pulmonary disease ([facility]) 06/06/2023    Generalized anxiety disorder 05/15/2023    Pure hypercholesterolemia 05/16/2023    Rheumatic fever in pediatric patient       Social History:  Social History Socioeconomic History    Marital status:    Tobacco Use    Smoking status: Every Day     Packs/day: 1.00     Years: 50.00     Additional pack years: 0.00     Total pack years: 50.00     Types: Cigarettes    Smokeless tobacco: Never   Vaping Use    Vaping Use: Former    Substances: THC, CBD   Substance and Sexual Activity    Alcohol use: Not Currently    Drug use: Yes     Types: Cannabis    Sexual activity: Not Currently     Partners: Male   Social History Narrative    Relationships:  - Jean Paul    Children: Son - Shayy Quevedo (adult)    Pets: Dog    School: N/A    Work: Retired - previously a dental . Origin: Grew up in the [facility]. Parents from [facility] [place], [place]. [place]. [place].      Spiritual: Not Sikh, not spiritual              Ash Hook MD  10/13/2023  1:44 PM

## 2023-10-20 ENCOUNTER — OFFICE VISIT (OUTPATIENT)
Dept: OTOLARYNGOLOGY | Facility: CLINIC | Age: 68
End: 2023-10-20
Payer: MEDICARE

## 2023-10-20 DIAGNOSIS — H61.22 IMPACTED CERUMEN, LEFT EAR: Primary | ICD-10-CM

## 2023-10-20 NOTE — PROGRESS NOTES
Samuel Garcia is a 80year old female. Patient presents with: Follow - Up: Patient here for 1 wk f/up for sore throat and to get her left ear cleaned. Patient reports her throat is feeling much better since last visit. ASSESSMENT AND PLAN:   1. Impacted cerumen, left ear  43-year-old presents in follow-up regarding her left ear cerumen impaction. She had been using Debrox drops for the last week. The wax is much softer today and easily removed. Had improvement in her hearing. We will see her periodically for cerumen in debridement. The patient indicates understanding of these issues and agrees to the plan. EXAM:   There were no vitals taken for this visit. Pertinent exam findings may also be noted above in assessment and plan     System Details   Skin Inspection - Normal.   Constitutional Overall appearance - Normal.   Head/Face Symmetric, TMJ tenderness not present    Eyes EOMI, PERRL   Right ear:  Canal clear, TM intact, no PADDY   Left ear:  Canal clear, TM intact, no PADDY   Nose: Septum midline, inferior turbinates not enlarged, nasal valves without collapse    Oral cavity/Oropharynx: No lesions or masses on inspection or palpation, tonsils symmetric    Neck: Soft without LAD, thyroid not enlarged  Voice clear/ no stridor   Other:      Scopes and Procedures:     Canals:  Left: Canal with cerumen preventing adequate view of TM, debrided with instrumentation    Tympanic Membranes:  Left: Normal tympanic membrane. TM Visualized Method:   Left TM examined via otomicroscopy. PROCEDURE:   Removal of cerumen impaction   The cerumen impaction was completely removed on the left side using microscopy as necessary. Removal was completed by using a curette and suction. Current Outpatient Medications   Medication Sig Dispense Refill    loratadine-pseudoephedrine ER  MG Oral Tablet 24 Hr Take 1 tablet by mouth at bedtime.  30 tablet 1    azelastine 0.1 % Nasal Solution 2 sprays by Nasal route 2 (two) times daily. 30 mL 0    Budesonide-Formoterol Fumarate (SYMBICORT) 80-4.5 MCG/ACT Inhalation Aerosol Inhale 2 puffs into the lungs 2 (two) times daily. 10.2 g 3    Varenicline Tartrate, Starter, (CHANTIX STARTING MONTH PAK) 0.5 MG X 11 & 1 MG X 42 Oral Tablet Therapy Pack Take 1 each by mouth daily. Days 1 to 3: 0.5 mg once daily. Days 4 to 7: 0.5 mg twice daily. Maintenance (day 8 and later): 1 mg twice daily 53 each 0    ALPRAZolam 0.5 MG Oral Tab Take 1 tablet (0.5 mg total) by mouth nightly as needed for Anxiety. 30 tablet 0    atorvastatin 10 MG Oral Tab Take 1 tablet (10 mg total) by mouth nightly. 90 tablet 1      Past Medical History:   Diagnosis Date    Chronic obstructive pulmonary disease ([facility] [address] 06/06/2023    Generalized anxiety disorder 05/15/2023    Pure hypercholesterolemia 05/16/2023    Rheumatic fever in pediatric patient       Social History:  Social History     Socioeconomic History    Marital status:    Tobacco Use    Smoking status: Every Day     Packs/day: 1.00     Years: 50.00     Additional pack years: 0.00     Total pack years: 50.00     Types: Cigarettes    Smokeless tobacco: Never   Vaping Use    Vaping Use: Former    Substances: THC, CBD   Substance and Sexual Activity    Alcohol use: Not Currently    Drug use: Yes     Types: Cannabis    Sexual activity: Not Currently     Partners: Male   Social History Narrative    Relationships:  - Jean Paul    Children: Son - Karolyn Krabbe (adult)    Pets: Dog    School: N/A    Work: Retired - previously a dental . Origin: Grew up in the [facility]. Parents from [facility] [place], [place]. [place]. [place].      Spiritual: Not Anglican, not spiritual              Mary Bravo MD  10/20/2023  12:32 PM

## 2023-11-06 RX ORDER — AZELASTINE 1 MG/ML
2 SPRAY, METERED NASAL 2 TIMES DAILY
Qty: 30 ML | Refills: 0 | Status: SHIPPED | OUTPATIENT
Start: 2023-11-06

## 2023-11-06 NOTE — TELEPHONE ENCOUNTER
Refilled per protocol    Future Appointments   Date Time Provider Guadalupe Stringer   11/14/2023 10:40 AM ADO HOLLY RM1 ADO HOLLY EM [place]     Anti-histamine Medications:  Loratadine  Cetirizine  Azelastin  Fexofenadine  Levocetirizine  Corticosteroid Medications:  Fluticasone  Mometasone (Nasonex)  Leukotriene Receptor Antagonist:  Montelukast (Singulair)    Protocol Criteria:  Appointment scheduled in past 12 months  Refill: \"Per Protocol: No Cosign Required\"  Approval of this protocol is dependent on manual look up of last PROVIDER ASSESSMENT AND PLAN and patient compliance with PROVIDER ASSESSMENT AND PLAN follow up recommendations    Permitted Refill Frequency:  1 year (may provide 90 day supply with 3 refills)  Recent Outpatient Visits              [facility] weeks ago [facility] Silverio Chavis [facility]    Office Visit    3 weeks ago Bilateral impacted cerumen    [facility], Karla Weller, Silverio Chavis MD    Office Visit    3 months ago Chronic obstructive pulmonary disease, unspecified COPD type [facility])    [address] [facility], Anderson Paniagua MD    Office Visit    4 months ago Health maintenance examination    [address] [facility], José Miguel Neves MD    Office Visit    5 months ago Dyspnea on exertion    [address], [address] [facility], Anderson Paniagua MD    Office Visit          Future Appointments         Provider Department Appt Notes    In 1 week ADO DEXA RM1; ADO HOLLY [address] [facility]

## 2023-11-14 ENCOUNTER — HOSPITAL ENCOUNTER (OUTPATIENT)
Dept: MAMMOGRAPHY | Age: 68
Discharge: HOME | End: 2023-11-14
Attending: FAMILY MEDICINE
Payer: MEDICARE

## 2023-11-14 DIAGNOSIS — Z12.31 SCREENING MAMMOGRAM FOR BREAST CANCER: ICD-10-CM

## 2023-11-14 PROCEDURE — 77067 SCR MAMMO BI INCL CAD: CPT | Performed by: FAMILY MEDICINE

## 2023-11-14 PROCEDURE — 77063 BREAST TOMOSYNTHESIS BI: CPT | Performed by: FAMILY MEDICINE

## 2023-11-19 DIAGNOSIS — J44.9 CHRONIC OBSTRUCTIVE PULMONARY DISEASE, UNSPECIFIED COPD TYPE (HCC): ICD-10-CM

## 2023-11-20 RX ORDER — DILTIAZEM HYDROCHLORIDE 60 MG/1
2 TABLET, FILM COATED ORAL 2 TIMES DAILY
Qty: 10.2 G | Refills: 3 | Status: SHIPPED | OUTPATIENT
Start: 2023-11-20

## 2023-11-24 DIAGNOSIS — E78.00 PURE HYPERCHOLESTEROLEMIA: ICD-10-CM

## 2023-11-24 RX ORDER — ATORVASTATIN CALCIUM 10 MG/1
10 TABLET, FILM COATED ORAL NIGHTLY
Qty: 90 TABLET | Refills: 0 | Status: SHIPPED | OUTPATIENT
Start: 2023-11-24

## 2024-01-04 RX ORDER — AZELASTINE 1 MG/ML
2 SPRAY, METERED NASAL 2 TIMES DAILY
Qty: 30 ML | Refills: 0 | Status: SHIPPED | OUTPATIENT
Start: 2024-01-04

## 2024-02-20 DIAGNOSIS — E78.00 PURE HYPERCHOLESTEROLEMIA: ICD-10-CM

## 2024-02-20 RX ORDER — ATORVASTATIN CALCIUM 10 MG/1
10 TABLET, FILM COATED ORAL NIGHTLY
Qty: 90 TABLET | Refills: 0 | Status: SHIPPED | OUTPATIENT
Start: 2024-02-20

## 2024-03-07 ENCOUNTER — TELEPHONE (OUTPATIENT)
Dept: INTERNAL MEDICINE CLINIC | Facility: CLINIC | Age: 69
End: 2024-03-07

## 2024-03-23 DIAGNOSIS — J44.9 CHRONIC OBSTRUCTIVE PULMONARY DISEASE, UNSPECIFIED COPD TYPE (HCC): ICD-10-CM

## 2024-03-25 DIAGNOSIS — J44.9 CHRONIC OBSTRUCTIVE PULMONARY DISEASE, UNSPECIFIED COPD TYPE (HCC): ICD-10-CM

## 2024-03-25 RX ORDER — BUDESONIDE AND FORMOTEROL FUMARATE DIHYDRATE 80; 4.5 UG/1; UG/1
2 AEROSOL RESPIRATORY (INHALATION) 2 TIMES DAILY
Qty: 10.2 G | Refills: 1 | Status: SHIPPED | OUTPATIENT
Start: 2024-03-25 | End: 2024-03-25

## 2024-03-25 RX ORDER — BUDESONIDE AND FORMOTEROL FUMARATE DIHYDRATE 80; 4.5 UG/1; UG/1
2 AEROSOL RESPIRATORY (INHALATION) 2 TIMES DAILY
Qty: 10.2 G | Refills: 0 | OUTPATIENT
Start: 2024-03-25

## 2024-03-25 RX ORDER — BUDESONIDE AND FORMOTEROL FUMARATE DIHYDRATE 80; 4.5 UG/1; UG/1
2 AEROSOL RESPIRATORY (INHALATION) 2 TIMES DAILY
Qty: 10.2 G | Refills: 1 | Status: SHIPPED | OUTPATIENT
Start: 2024-03-25

## 2024-04-23 NOTE — PROGRESS NOTES
FAMILY MEDICINE CLINIC NOTE - MEDICARE    HPI  Angela Way is a 69 year old female presenting for a MA (Medicare Advantage) Supervisit (Once per calendar year).    #Health Maintenance  -Diet: Doesn't eat much - primarily eats one meal a day. Fills up fast. Does eat sweet at night.   -Exercise: Walking  -Lung cancer screen: Indicated - Ordered  -Colon cancer screen: Cologuard 7/10/2023 negative  -Statin:  - 5/2023  -ASA: Not indicated  -Breast cancer screen:  11/2023  -Breast cancer medication to reduce risk: Declines   -Periods: Menopause  -Cervical cancer screen: Not indicated  -DEXA: Indicated - Ordered  -BRCA: Not indicated  -Intimate partner violence: Denies abuse  -HIV screen: Not indicated  -Hep C screen: - 5/2023 negative  -Gonorrhea/chlamydia:  Not Indicated  -Syphillis: Not indicated  -TB: Not indicated  -Tobacco/alcohol: Per below        #Immunizations  -Tdap: Not indicated  -Flu shot: Not indicated  -PCV13: Not indicated   -PCV20:  5/2023    -PPSV23: Not indicated   -RZV (preferred) or VZL: Indicated   -RSV: Indicated  -COVID: Indicated       #COPD  -PFT 6/2023  -symbicort 80-4.5 mcg 2 puffs twice daily   -lately breathing worsening     #Cigarette smoker  -20 pack year smoking history  -never started the chantix      #Hgb   -CBC Hgb 17.0    #HLD  -atorvastatin 10 mg nightly  -no issues     #Prediabetes  -Hba1c 5.8     #Cerumen impaction  -using debrox, still feels clogged  -resolved - saw Dr Raza      #Anxiety  -notes phobia of driving as well  -notes white coat syndrome  -notes anxiety at baseline  -can typically talk herself down  -denies depression  -PHQ2 score of 0  -GAD7 score of 7, not difficulty  -no SI/HI  -no AVH  -no significant trauma  -denies symptoms of lane  -has seen therapy in the past  -has alprazolam to use as needed sparingly  -reports anxiety is controlled - has alprazolam #30 for entire year, prescribed 6/28/23 - not using    #Additional screenings  History/Other:   Fall Risk  Assessment:   She has been screened for Falls and is low risk.      Cognitive Assessment:   She had a completely normal cognitive assessment - see flowsheet entries       Functional Ability/Status:   Angela Way has a completely normal functional assessment. See flowsheet for details.      Depression Screening (PHQ-2/PHQ-9): PHQ-2 SCORE: 0  , done 4/24/2024        Advanced Directives:   She does have a Living Will but we do NOT have it on file in Epic.    She does have a POA but we do NOT have it on file in Epic.    Discussed Advance Care Planning with patient (and family/surrogate if present). Standard forms made available to patient in After Visit Summary.    #Patient Care Team  Patient Care Team:  Lb Gould MD as PCP - General (Family Medicine)      ROS  GENERAL: No fever/chills, no recent weight loss   HEENT: No visual changes, no changes in hearing, no sore throats  NECK: No pain, no swelling  RESP: No cough, no SOB  CV: No chest pain, no palpitations  GI: No abd pain, no N/V/D  MSK: No edema, no pain  SKIN: No new rashes  NEURO: No numbness, no tingling, no HA    HEALTH MAINTENANCE CHECKLIST  Health Maintenance Topics with due status: Overdue       Topic Date Due    Tobacco Cessation Counseling 1 Year Never done    Zoster Vaccines Never done    Lung Cancer Screening Never done    DEXA Scan Never done    COVID-19 Vaccine 09/01/2023    MA Annual Health Assessment 01/01/2024    Annual Depression Screening 01/01/2024    Fall Risk Screening (Annual) 01/01/2024       ALLERGIES  Allergies   Allergen Reactions    Codeine NAUSEA AND VOMITING       MEDICATIONS  Current Outpatient Medications   Medication Sig Dispense Refill    Budesonide-Formoterol Fumarate (SYMBICORT) 160-4.5 MCG/ACT Inhalation Aerosol Inhale 2 puffs into the lungs 2 (two) times daily. 10.2 g 0    atorvastatin 10 MG Oral Tab Take 1 tablet (10 mg total) by mouth nightly. 90 tablet 0    AZELASTINE 0.1 % Nasal Solution Spray 2 sprays by Nasal  route 2 (two) times daily. 30 mL 0    ALPRAZolam 0.5 MG Oral Tab Take 1 tablet (0.5 mg total) by mouth nightly as needed for Anxiety. 30 tablet 0    Varenicline Tartrate, Starter, (CHANTIX STARTING MONTH PAK) 0.5 MG X 11 & 1 MG X 42 Oral Tablet Therapy Pack Take 1 each by mouth daily. Days 1 to 3: 0.5 mg once daily. Days 4 to 7: 0.5 mg twice daily. Maintenance (day 8 and later): 1 mg twice daily 53 each 0       ACTIVE PROBLEM  Patient Active Problem List   Diagnosis    Generalized anxiety disorder    Bilateral impacted cerumen    Health maintenance examination    Cigarette smoker    Panic attacks    Pure hypercholesterolemia    Prediabetes    Elevated hemoglobin (HCC)    Chronic obstructive pulmonary disease (HCC)    Overweight (BMI 25.0-29.9)       PAST MEDICAL HISTORY  Past Medical History:    Chronic obstructive pulmonary disease (HCC)    Generalized anxiety disorder    Pure hypercholesterolemia    Rheumatic fever in pediatric patient       PAST SOCIAL HISTORY  Social History     Socioeconomic History    Marital status:      Spouse name: Not on file    Number of children: Not on file    Years of education: Not on file    Highest education level: Not on file   Occupational History    Not on file   Tobacco Use    Smoking status: Every Day     Current packs/day: 1.00     Average packs/day: 1 pack/day for 50.3 years (50.3 ttl pk-yrs)     Types: Cigarettes     Start date: 1974    Smokeless tobacco: Never   Vaping Use    Vaping status: Former    Substances: THC, CBD   Substance and Sexual Activity    Alcohol use: Not Currently    Drug use: Yes     Types: Cannabis    Sexual activity: Not Currently     Partners: Male   Other Topics Concern    Caffeine Concern Not Asked    Exercise Not Asked    Seat Belt Not Asked    Special Diet Not Asked    Stress Concern Not Asked    Weight Concern Not Asked   Social History Narrative    Relationships:  - Jean Paul    Children: Jabier Wilks (adult)    Pets: Dog - passed away.  Got another dog.     School: N/A    Work: Retired - previously a dental .     Origin: Grew up in the [place] area. Parents from [place]    Interests: Shopping - [employer], flee market. Reading. Cleaning.     Spiritual: Not Spiritism, not spiritual         Social Determinants of Health     Financial Resource Strain: Not on file   Food Insecurity: Not on file   Transportation Needs: Not on file   Physical Activity: Not on file   Stress: Not on file   Social Connections: Not on file   Housing Stability: Not on file       CAGE Alcohol Screen:   CAGE screening score of 0 on 2024, showing low risk of alcohol abuse.          PAST SURGICAL HISTORY  Past Surgical History:   Procedure Laterality Date             PAST FAMILY HISTORY  Family History   Problem Relation Age of Onset    Heart Disease Mother     Diabetes Mother     Cancer Father         Liver    Diabetes Father     No Known Problems Sister     No Known Problems Sister     No Known Problems Maternal Grandmother     No Known Problems Maternal Grandfather     No Known Problems Paternal Grandmother     No Known Problems Paternal Grandfather     Breast Cancer Paternal Aunt     Colon Cancer Neg        PHYSICAL EXAM  Vitals:    24 1127   BP: 126/78   Pulse: 84   Temp: 97.1 °F (36.2 °C)   SpO2: 97%   Weight: 173 lb (78.5 kg)   Height: 5' 5\" (1.651 m)      Body mass index is 28.79 kg/m².    Medicare Hearing Assessment:  Hearing Screening    Entry User: Kailyn Harrell CMA  Screening Method: Questionnaire  I have a problem hearing over the telephone: No I have trouble following the conversations when two or more people are talking at the same time: No   I have trouble understanding things on the TV: No I have to strain to understand conversations: No   I have to worry about missing the telephone ring or doorbell: No I have trouble hearing conversations in a noisy background such as a crowded room or restaurant: No   I get confused about where  sounds come from: No I misunderstand some words in a sentence and need to ask people to repeat themselves: No   I especially have trouble understanding the speech of women and children: No I have trouble understanding the speaker in a large room such as at a meeting or place of Christianity: No   Many people I talk to seem to mumble (or don't speak clearly): No People get annoyed because I misunderstand what they say: No   I misunderstand what others are saying and make inappropriate responses: No I avoid social activities because I cannot hear well and fear I will reply improperly: No   Family members and friends have told me they think I may have hearing loss: No           Visual Acuity:   Visual Acuity:   Right Eye Visual Acuity: Corrected Right Eye Chart Acuity: 20/25   Left Eye Visual Acuity: Corrected Left Eye Chart Acuity: 20/25   Both Eyes Visual Acuity: Corrected Both Eyes Chart Acuity: 20/20   Able To Tolerate Visual Acuity: Yes          GENERAL: NAD  HEENT: Moist mucous membranes, no tonsillar swelling or erythema, PERRLA bilat, TM translucent and non-bulging  NECK: Supple, non-tender  RESP: CTAB, no wheezing, no rales, no ronchi. Breath sounds are slightly diminished at the bases  CV: RRR, no murmurs  GI: Soft, non-distended, non-tender, no guarding, no rebound, no masses  MSK: No edema  SKIN: Warm and dry, no rashes  NEURO: Answering questions appropriately    LABS    Lab Results   Component Value Date    WBC 7.4 07/31/2023    HGB 15.9 (H) 07/31/2023     07/31/2023       Lab Results   Component Value Date    AST 15 07/31/2023    ALT 13 07/31/2023    CA 9.8 07/31/2023    ALB 4.6 07/31/2023    TSH 0.614 05/15/2023    CREATSERUM 0.68 07/31/2023     (H) 07/31/2023       Lab Results   Component Value Date    CHOLEST 167 07/31/2023    HDL 69 07/31/2023    LDL 83 07/31/2023    TRIG 72 07/31/2023         DIAGNOSTICS    ASSESSMENT/PLAN  Problem List Items Addressed This Visit          HCC Problems     Chronic obstructive pulmonary disease (HCC)     She reports breathing recently slightly worse.  Symbicort at this time-advise continuing.  Needs to quit smoking         Relevant Medications    Budesonide-Formoterol Fumarate (SYMBICORT) 160-4.5 MCG/ACT Inhalation Aerosol    Elevated hemoglobin (HCC)     Recent polycythemia, most likely secondary to smoking and COPD  She is hoping to stop smoking  Monitor CBC  Check EPO, blood smear  Will consider JAK2 moving forward.         Relevant Orders    CBC With Differential With Platelet    Erythropoietin (EPO), Serum    Pathologist review of peripheral smear [833] [Q]       Cardiac and Vasculature    Pure hypercholesterolemia     Atorvastatin 10 mg nightly  Check CMP and lipid panel             Endocrine and Metabolic    Overweight (BMI 25.0-29.9)     Focus on a healthy diet and exercise         Prediabetes     Healthy diet and exercise advised.  Check A1c             Mental Health    Generalized anxiety disorder     Patient with generalized anxiety disorder.  Also intermittent panic attacks and phobias.  She is overall controlling her anxiety but does note that she might be interested in long-term management of anxiety with medications.  Overall controlled for now.  Has alprazolam to use sparingly as needed for panic attacks-she has alprazolam 0.5 mg as needed #30 to use over the course of 1 year.  Last refill provided June 28, 2023 for 1 year supply. Did have recent anxiety when her dog diet, had to use a few times. She reports that she has a little over a half remaining. If needing refill, can message me for another one year supply when needing.          Panic attacks     Patient with generalized anxiety disorder.  Also intermittent panic attacks and phobias.  She is overall controlling her anxiety but does note that she might be interested in long-term management of anxiety with medications.  Overall controlled for now.  Has alprazolam to use sparingly as needed for  panic attacks-she has alprazolam 0.5 mg as needed #30 to use over the course of 1 year.  Last refill provided June 28, 2023 for 1 year supply. Did have recent anxiety when her dog diet, had to use a few times. She reports that she has a little over a half remaining. If needing refill, can message me for another one year supply when needing.             EarNoseThroat    Bilateral impacted cerumen     No further cerumen impaction.  Desires to regularly follow with ENT for management         Relevant Orders    ENT - INTERNAL       Tobacco    Cigarette smoker     Smoking cessation advised.  She is open to discontinuing smoking - but hesitant to start medication  Varenicline starter pack prescribed previously - can start. If needing continued course can notify me.   Advised smoking cessation hotline.   CT chest ordered previously.          Relevant Orders    CT LUNG LD SCREENING(CPT=71271)       Health Encounters    Health maintenance examination - Primary     Exercise and diet advised.  Shingles and RSV vaccine-advised to get done at pharmacy  Advanced directive information provided - needs to provide us with copy  Advised COVID vaccine booster.  Mammogram ordered for the fall.   DEXA scan ordered, reminded to get done.  CT lung screen ordered ,reminded to get done.           Relevant Orders    CBC With Differential With Platelet    Comp Metabolic Panel (14)    Hemoglobin A1C    Lipid Panel    XR DEXA BONE DENSITOMETRY (CPT=77080)    [facility] K[facility]LOE 2D+3D SCREENING BILAT (CPT=77067/91821)     Other Visit Diagnoses       Screening mammogram for breast cancer        Relevant Orders    [facility] KHLOE 2D+3D SCREENING BILAT (CPT=77067/13734)            Return in about 1 year (around 4/24/2025) for medicare visit .    Lb Gould MD  Family Medicine    4/23/2024         Supplementary Documentation:   General Health:  In the past six months, have you lost more than 10 pounds without trying?: 2 - No  Has your appetite been poor?:  No  Type of Diet: Balanced  How does the patient maintain a good energy level?: Daily Walks  How would you describe your daily physical activity?: Light  How would you describe your current health state?: Fair  How do you maintain positive mental well-being?: Social Interaction;Games;Visiting Friends;Visiting Family  On a scale of 0 to 10, with 0 being no pain and 10 being severe pain, what is your pain level?: 1 - (Mild)  In the past six months, have you experienced urine leakage?: 0-No  At any time do you feel concerned for the safety/well-being of yourself and/or your children, in your home or elsewhere?: No  Have you had any immunizations at another office such as Influenza, Hepatitis B, Tetanus, or Pneumococcal?: No       Angela Way's SCREENING SCHEDULE   Tests on this list are recommended by your physician but may not be covered, or covered at this frequency, by your insurer.   Please check with your insurance carrier before scheduling to verify coverage.   PREVENTATIVE SERVICES FREQUENCY &  COVERAGE DETAILS LAST COMPLETION DATE   Diabetes Screening    Fasting Blood Sugar /  Glucose    One screening every 12 months if never tested or if previously tested but not diagnosed with pre-diabetes   One screening every 6 months if diagnosed with pre-diabetes Lab Results   Component Value Date     (H) 07/31/2023        Cardiovascular Disease Screening    Lipid Panel  Cholesterol  Lipoprotein (HDL)  Triglycerides Covered every 5 years for all Medicare beneficiaries without apparent signs or symptoms of cardiovascular disease Lab Results   Component Value Date    CHOLEST 167 07/31/2023    HDL 69 07/31/2023    LDL 83 07/31/2023    TRIG 72 07/31/2023         Electrocardiogram (EKG)   Covered if needed at Welcome to Medicare, and non-screening if indicated for medical reasons -      Ultrasound Screening for Abdominal Aortic Aneurysm (AAA) Covered once in a lifetime for one of the following risk factors    Men  who are 65-75 years old and have ever smoked    Anyone with a family history -     Colorectal Cancer Screening  Covered for ages 50-85; only need ONE of the following:    Colonoscopy   Covered every 10 years    Covered every 2 years if patient is at high risk or previous colonoscopy was abnormal -    Health Maintenance   Topic Date Due    Colorectal Cancer Screening  07/10/2026       Flexible Sigmoidoscopy   Covered every 4 years -    Fecal Occult Blood Test Covered annually -   Bone Density Screening    Bone density screening    Covered every 2 years after age 65 if diagnosed with risk of osteoporosis or estrogen deficiency.    Covered yearly for long-term glucocorticoid medication use (Steroids) No results found for this or any previous visit.      Health Maintenance Due   Topic Date Due    DEXA Scan  Never done      Pap and Pelvic    Pap   Covered every 2 years for women at normal risk; Annually if at high risk -  No recommendations at this time    Chlamydia Annually if high risk -  No recommendations at this time   Screening Mammogram    Mammogram     Recommend annually for all female patients aged 40 and older    One baseline mammogram covered for patients aged 35-39 11/14/2023    Health Maintenance   Topic Date Due    Mammogram  11/14/2024       Immunizations    Influenza Covered once per flu season  Please get every year -  No recommendations at this time    Pneumococcal Each vaccine ([id number] & [id number]) covered once after 65 Prevnar 13: -    [id number]: -     No recommendations at this time    Hepatitis B One screening covered for patients with certain risk factors   -  No recommendations at this time    Tetanus Toxoid Not covered by Medicare Part B unless medically necessary (cut with metal); may be covered with your pharmacy prescription benefits -    Tetanus, Diptheria and Pertusis TD and TDaP Not covered by Medicare Part B -  No recommendations at this time    Zoster Not covered by Medicare Part B;  may be covered with your pharmacy  prescription benefits -  Zoster Vaccines(1 of 2) Never done     Chronic Obstructive Pulmonary Disease (COPD)    Spirometry Annually Spirometry date: 06/06/2023

## 2024-04-24 ENCOUNTER — OFFICE VISIT (OUTPATIENT)
Dept: INTERNAL MEDICINE CLINIC | Facility: CLINIC | Age: 69
End: 2024-04-24
Payer: MEDICARE

## 2024-04-24 VITALS
BODY MASS INDEX: 28.82 KG/M2 | DIASTOLIC BLOOD PRESSURE: 78 MMHG | TEMPERATURE: 97 F | HEART RATE: 84 BPM | HEIGHT: 65 IN | WEIGHT: 173 LBS | SYSTOLIC BLOOD PRESSURE: 126 MMHG | OXYGEN SATURATION: 97 %

## 2024-04-24 DIAGNOSIS — E78.00 PURE HYPERCHOLESTEROLEMIA: ICD-10-CM

## 2024-04-24 DIAGNOSIS — F17.210 CIGARETTE SMOKER: ICD-10-CM

## 2024-04-24 DIAGNOSIS — D58.2 ELEVATED HEMOGLOBIN (HCC): ICD-10-CM

## 2024-04-24 DIAGNOSIS — F41.1 GENERALIZED ANXIETY DISORDER: ICD-10-CM

## 2024-04-24 DIAGNOSIS — R73.03 PREDIABETES: ICD-10-CM

## 2024-04-24 DIAGNOSIS — Z12.31 SCREENING MAMMOGRAM FOR BREAST CANCER: ICD-10-CM

## 2024-04-24 DIAGNOSIS — H61.23 BILATERAL IMPACTED CERUMEN: ICD-10-CM

## 2024-04-24 DIAGNOSIS — F41.0 PANIC ATTACKS: ICD-10-CM

## 2024-04-24 DIAGNOSIS — J44.9 CHRONIC OBSTRUCTIVE PULMONARY DISEASE, UNSPECIFIED COPD TYPE (HCC): ICD-10-CM

## 2024-04-24 DIAGNOSIS — Z00.00 HEALTH MAINTENANCE EXAMINATION: Primary | ICD-10-CM

## 2024-04-24 DIAGNOSIS — E66.3 OVERWEIGHT (BMI 25.0-29.9): ICD-10-CM

## 2024-04-24 RX ORDER — BUDESONIDE AND FORMOTEROL FUMARATE DIHYDRATE 160; 4.5 UG/1; UG/1
2 AEROSOL RESPIRATORY (INHALATION) 2 TIMES DAILY
Qty: 10.2 G | Refills: 0 | Status: SHIPPED | OUTPATIENT
Start: 2024-04-24

## 2024-04-24 NOTE — ASSESSMENT & PLAN NOTE
Recent polycythemia, most likely secondary to smoking and COPD  She is hoping to stop smoking  Monitor CBC  Check EPO, blood smear  Will consider JAK2 moving forward.

## 2024-04-24 NOTE — ASSESSMENT & PLAN NOTE
Exercise and diet advised.  Shingles and RSV vaccine-advised to get done at pharmacy  Advanced directive information provided - needs to provide us with copy  Advised COVID vaccine booster.  Mammogram ordered for the fall.   DEXA scan ordered, reminded to get done.  CT lung screen ordered ,reminded to get done.

## 2024-04-24 NOTE — ASSESSMENT & PLAN NOTE
She reports breathing recently slightly worse.  Symbicort at this time-advise continuing.  Needs to quit smoking

## 2024-04-24 NOTE — ASSESSMENT & PLAN NOTE
Patient with generalized anxiety disorder.  Also intermittent panic attacks and phobias.  She is overall controlling her anxiety but does note that she might be interested in long-term management of anxiety with medications.  Overall controlled for now.  Has alprazolam to use sparingly as needed for panic attacks-she has alprazolam 0.5 mg as needed #30 to use over the course of 1 year.  Last refill provided June 28, 2023 for 1 year supply. Did have recent anxiety when her dog diet, had to use a few times. She reports that she has a little over a half remaining. If needing refill, can message me for another one year supply when needing.

## 2024-04-24 NOTE — ASSESSMENT & PLAN NOTE
Smoking cessation advised.  She is open to discontinuing smoking - but hesitant to start medication  Varenicline starter pack prescribed previously - can start. If needing continued course can notify me.   Advised smoking cessation hotline.   CT chest ordered previously.

## 2024-04-24 NOTE — PATIENT INSTRUCTIONS
PATIENT INSTRUCTIONS    Thank you for seeing me today, it was a pleasure taking care of you.  Please check out at the  and schedule a follow up appointment.  Return in about 1 year (around 4/24/2025) for medicare visit .  Please remember that the nona period for all appointments is 5 minutes. This is to help maximize the amount of time that we can spend together at our visits.    Please get your labs drawn - you may need to schedule a lab appointment if this was not completed at your recent doctor's visit.  The following imaging studies were ordered: Mammogram in November. DEXA bone density. CT scan of lungs  Please call [phone] to schedule your imaging appointment.   Please also follow up with the following specialists: ENT  Please fill out the advance directive information (power of  documents) and bring a copy to our clinic.  Symbicort - increasing dosage   Flu and RSV and vaccine   Stop smoking  Chantix, after starting notify me for continued course     Best,   Dr. Luis Fernando Way's SCREENING SCHEDULE   Tests on this list are recommended by your physician but may not be covered, or covered at this frequency, by your insurer.   Please check with your insurance carrier before scheduling to verify coverage.   PREVENTATIVE SERVICES FREQUENCY &  COVERAGE DETAILS LAST COMPLETION DATE   Diabetes Screening    Fasting Blood Sugar /  Glucose    One screening every 12 months if never tested or if previously tested but not diagnosed with pre-diabetes   One screening every 6 months if diagnosed with pre-diabetes Lab Results   Component Value Date     (H) 07/31/2023        Cardiovascular Disease Screening    Lipid Panel  Cholesterol  Lipoprotein (HDL)  Triglycerides Covered every 5 years for all Medicare beneficiaries without apparent signs or symptoms of cardiovascular disease Lab Results   Component Value Date    CHOLEST 167 07/31/2023    HDL 69 07/31/2023    LDL 83 07/31/2023     TRIG 72 07/31/2023         Electrocardiogram (EKG)   Covered if needed at Welcome to Medicare, and non-screening if indicated for medical reasons -      Ultrasound Screening for Abdominal Aortic Aneurysm (AAA) Covered once in a lifetime for one of the following risk factors   • Men who are 65-75 years old and have ever smoked   • Anyone with a family history -     Colorectal Cancer Screening  Covered for ages 50-85; only need ONE of the following:    Colonoscopy   Covered every 10 years    Covered every 2 years if patient is at high risk or previous colonoscopy was abnormal -    Health Maintenance   Topic Date Due   • Colorectal Cancer Screening  07/10/2026       Flexible Sigmoidoscopy   Covered every 4 years -    Fecal Occult Blood Test Covered annually -   Bone Density Screening    Bone density screening    Covered every 2 years after age 65 if diagnosed with risk of osteoporosis or estrogen deficiency.    Covered yearly for long-term glucocorticoid medication use (Steroids) No results found for this or any previous visit.      Health Maintenance Due   Topic Date Due   • DEXA Scan  Never done      Pap and Pelvic    Pap   Covered every 2 years for women at normal risk; Annually if at high risk -  No recommendations at this time    Chlamydia Annually if high risk -  No recommendations at this time   Screening Mammogram    Mammogram     Recommend annually for all female patients aged 40 and older    One baseline mammogram covered for patients aged 35-39 11/14/2023    Health Maintenance   Topic Date Due   • Mammogram  11/14/2024       Immunizations    Influenza Covered once per flu season  Please get every year -  No recommendations at this time    Pneumococcal Each vaccine ([id number] & [id number]) covered once after 65 Prevnar 13: -    [id number]: -     No recommendations at this time    Hepatitis B One screening covered for patients with certain risk factors   -  No recommendations at this time    Tetanus Toxoid  Not covered by Medicare Part B unless medically necessary (cut with metal); may be covered with your pharmacy prescription benefits -    Tetanus, Diptheria and Pertusis TD and TDaP Not covered by Medicare Part B -  No recommendations at this time    Zoster Not covered by Medicare Part B; may be covered with your pharmacy  prescription benefits -  Zoster Vaccines(1 of 2) Never done     Chronic Obstructive Pulmonary Disease (COPD)    Spirometry Annually Spirometry date: 06/06/2023

## 2024-04-26 LAB
ABSOLUTE BASOPHILS: 50 CELLS/UL (ref 0–200)
ABSOLUTE EOSINOPHILS: 58 CELLS/UL (ref 15–500)
ABSOLUTE LYMPHOCYTES: 2291 CELLS/UL (ref 850–3900)
ABSOLUTE MONOCYTES: 564 CELLS/UL (ref 200–950)
ABSOLUTE NEUTROPHILS: 5337 CELLS/UL (ref 1500–7800)
ALBUMIN/GLOBULIN RATIO: 1.9 (CALC) (ref 1–2.5)
ALBUMIN: 4.7 G/DL (ref 3.6–5.1)
ALKALINE PHOSPHATASE: 84 U/L (ref 37–153)
ALT: 13 U/L (ref 6–29)
AST: 15 U/L (ref 10–35)
BASOPHILS: 0.6 %
BILIRUBIN, TOTAL: 1.1 MG/DL (ref 0.2–1.2)
BUN: 19 MG/DL (ref 7–25)
CALCIUM: 9.6 MG/DL (ref 8.6–10.4)
CARBON DIOXIDE: 25 MMOL/L (ref 20–32)
CHLORIDE: 105 MMOL/L (ref 98–110)
CHOL/HDLC RATIO: 2.3 (CALC)
CHOLESTEROL, TOTAL: 166 MG/DL
CREATININE: 0.56 MG/DL (ref 0.5–1.05)
EGFR: 99 ML/MIN/1.73M2
EOSINOPHILS: 0.7 %
ERYTHROPOIETIN: 9.2 MIU/ML (ref 2.6–18.5)
GLOBULIN: 2.5 G/DL (CALC) (ref 1.9–3.7)
GLUCOSE: 112 MG/DL (ref 65–99)
HDL CHOLESTEROL: 71 MG/DL
HEMATOCRIT: 47.3 % (ref 35–45)
HEMOGLOBIN A1C: 5.7 % OF TOTAL HGB
HEMOGLOBIN: 16.4 G/DL (ref 11.7–15.5)
LDL-CHOLESTEROL: 78 MG/DL (CALC)
LYMPHOCYTES: 27.6 %
MCH: 32.3 PG (ref 27–33)
MCHC: 34.7 G/DL (ref 32–36)
MCV: 93.1 FL (ref 80–100)
MONOCYTES: 6.8 %
MPV: 10.2 FL (ref 7.5–12.5)
NEUTROPHILS: 64.3 %
NON-HDL CHOLESTEROL: 95 MG/DL (CALC)
PLATELET COUNT: 275 THOUSAND/UL (ref 140–400)
POTASSIUM: 4.7 MMOL/L (ref 3.5–5.3)
PROTEIN, TOTAL: 7.2 G/DL (ref 6.1–8.1)
RDW: 12.1 % (ref 11–15)
RED BLOOD CELL COUNT: 5.08 MILLION/UL (ref 3.8–5.1)
SODIUM: 139 MMOL/L (ref 135–146)
TRIGLYCERIDES: 87 MG/DL
WHITE BLOOD CELL COUNT: 8.3 THOUSAND/UL (ref 3.8–10.8)

## 2024-05-19 DIAGNOSIS — J44.9 CHRONIC OBSTRUCTIVE PULMONARY DISEASE, UNSPECIFIED COPD TYPE (HCC): ICD-10-CM

## 2024-05-20 RX ORDER — BUDESONIDE AND FORMOTEROL FUMARATE DIHYDRATE 160; 4.5 UG/1; UG/1
2 AEROSOL RESPIRATORY (INHALATION) 2 TIMES DAILY
Qty: 10.2 G | Refills: 3 | Status: SHIPPED | OUTPATIENT
Start: 2024-05-20

## 2024-05-20 NOTE — TELEPHONE ENCOUNTER
A refill request was received for:  Requested Prescriptions     Pending Prescriptions Disp Refills    SYMBICORT 160-4.5 MCG/ACT Inhalation Aerosol [Pharmacy Med Name: Symbicort 160-4.5 Mcg/Act Aer Astr] 10.2 g 0     Sig: INHALE 2 PUFFS INTO THE LUNGS 2 (TWO) TIMES DAILY.     Last refill date:  4/24/24    Last office visit: 4/24/24      No future appointments.

## 2024-06-19 DIAGNOSIS — E78.00 PURE HYPERCHOLESTEROLEMIA: ICD-10-CM

## 2024-06-19 RX ORDER — ATORVASTATIN CALCIUM 10 MG/1
10 TABLET, FILM COATED ORAL NIGHTLY
Qty: 90 TABLET | Refills: 0 | Status: SHIPPED | OUTPATIENT
Start: 2024-06-19

## 2024-08-26 DIAGNOSIS — E78.00 PURE HYPERCHOLESTEROLEMIA: ICD-10-CM

## 2024-08-26 RX ORDER — ATORVASTATIN CALCIUM 10 MG/1
10 TABLET, FILM COATED ORAL NIGHTLY
Qty: 90 TABLET | Refills: 0 | Status: SHIPPED | OUTPATIENT
Start: 2024-08-26

## 2024-09-06 DIAGNOSIS — J44.9 CHRONIC OBSTRUCTIVE PULMONARY DISEASE, UNSPECIFIED COPD TYPE (HCC): ICD-10-CM

## 2024-09-06 RX ORDER — BUDESONIDE AND FORMOTEROL FUMARATE DIHYDRATE 160; 4.5 UG/1; UG/1
2 AEROSOL RESPIRATORY (INHALATION) 2 TIMES DAILY
Qty: 10.2 G | Refills: 0 | Status: SHIPPED | OUTPATIENT
Start: 2024-09-06

## 2024-10-07 DIAGNOSIS — J44.9 CHRONIC OBSTRUCTIVE PULMONARY DISEASE, UNSPECIFIED COPD TYPE (HCC): ICD-10-CM

## 2024-10-07 RX ORDER — BUDESONIDE AND FORMOTEROL FUMARATE DIHYDRATE 160; 4.5 UG/1; UG/1
2 AEROSOL RESPIRATORY (INHALATION) 2 TIMES DAILY
Qty: 10.2 G | Refills: 0 | Status: SHIPPED | OUTPATIENT
Start: 2024-10-07

## 2024-10-19 ENCOUNTER — HOSPITAL ENCOUNTER (EMERGENCY)
Facility: HOSPITAL | Age: 69
Discharge: HOME | End: 2024-10-19
Attending: EMERGENCY MEDICINE
Payer: MEDICARE

## 2024-10-19 ENCOUNTER — APPOINTMENT (OUTPATIENT)
Dept: CT IMAGING | Facility: HOSPITAL | Age: 69
End: 2024-10-19
Attending: EMERGENCY MEDICINE
Payer: MEDICARE

## 2024-10-19 VITALS
DIASTOLIC BLOOD PRESSURE: 70 MMHG | RESPIRATION RATE: 16 BRPM | OXYGEN SATURATION: 95 % | SYSTOLIC BLOOD PRESSURE: 117 MMHG | TEMPERATURE: 98 F | HEART RATE: 88 BPM

## 2024-10-19 DIAGNOSIS — R10.9 ABDOMINAL PAIN, RIGHT LATERAL: ICD-10-CM

## 2024-10-19 DIAGNOSIS — R21 RASH: Primary | ICD-10-CM

## 2024-10-19 LAB
ALBUMIN SERPL-MCNC: 5.1 G/DL (ref 3.2–4.8)
ALP LIVER SERPL-CCNC: 93 U/L
ALT SERPL-CCNC: 14 U/L
ANION GAP SERPL CALC-SCNC: 8 MMOL/L (ref 0–18)
AST SERPL-CCNC: 19 U/L (ref ?–34)
BASOPHILS # BLD AUTO: 0.04 X10(3) UL (ref 0–0.2)
BASOPHILS NFR BLD AUTO: 0.8 %
BILIRUB DIRECT SERPL-MCNC: 0.4 MG/DL (ref ?–0.3)
BILIRUB SERPL-MCNC: 1.2 MG/DL (ref 0.2–1.1)
BILIRUB UR QL: NEGATIVE
BUN BLD-MCNC: 9 MG/DL (ref 9–23)
BUN/CREAT SERPL: 14.3 (ref 10–20)
CALCIUM BLD-MCNC: 9.7 MG/DL (ref 8.7–10.4)
CHLORIDE SERPL-SCNC: 110 MMOL/L (ref 98–112)
CLARITY UR: CLEAR
CO2 SERPL-SCNC: 23 MMOL/L (ref 21–32)
CREAT BLD-MCNC: 0.63 MG/DL
DEPRECATED RDW RBC AUTO: 41.7 FL (ref 35.1–46.3)
EGFRCR SERPLBLD CKD-EPI 2021: 97 ML/MIN/1.73M2 (ref 60–?)
EOSINOPHIL # BLD AUTO: 0.05 X10(3) UL (ref 0–0.7)
EOSINOPHIL NFR BLD AUTO: 0.9 %
ERYTHROCYTE [DISTWIDTH] IN BLOOD BY AUTOMATED COUNT: 12.5 % (ref 11–15)
GLUCOSE BLD-MCNC: 113 MG/DL (ref 70–99)
GLUCOSE UR-MCNC: NORMAL MG/DL
HCT VFR BLD AUTO: 48.1 %
HGB BLD-MCNC: 16.9 G/DL
HGB UR QL STRIP.AUTO: NEGATIVE
IMM GRANULOCYTES # BLD AUTO: 0.02 X10(3) UL (ref 0–1)
IMM GRANULOCYTES NFR BLD: 0.4 %
KETONES UR-MCNC: NEGATIVE MG/DL
LEUKOCYTE ESTERASE UR QL STRIP.AUTO: NEGATIVE
LIPASE SERPL-CCNC: 34 U/L (ref 12–53)
LYMPHOCYTES # BLD AUTO: 1.14 X10(3) UL (ref 1–4)
LYMPHOCYTES NFR BLD AUTO: 21.5 %
MCH RBC QN AUTO: 31.7 PG (ref 26–34)
MCHC RBC AUTO-ENTMCNC: 35.1 G/DL (ref 31–37)
MCV RBC AUTO: 90.2 FL
MONOCYTES # BLD AUTO: 0.59 X10(3) UL (ref 0.1–1)
MONOCYTES NFR BLD AUTO: 11.1 %
NEUTROPHILS # BLD AUTO: 3.46 X10 (3) UL (ref 1.5–7.7)
NEUTROPHILS # BLD AUTO: 3.46 X10(3) UL (ref 1.5–7.7)
NEUTROPHILS NFR BLD AUTO: 65.3 %
NITRITE UR QL STRIP.AUTO: NEGATIVE
OSMOLALITY SERPL CALC.SUM OF ELEC: 291 MOSM/KG (ref 275–295)
PH UR: 5.5 [PH] (ref 5–8)
PLATELET # BLD AUTO: 210 10(3)UL (ref 150–450)
POTASSIUM SERPL-SCNC: 3.9 MMOL/L (ref 3.5–5.1)
PROT SERPL-MCNC: 7.6 G/DL (ref 5.7–8.2)
PROT UR-MCNC: NEGATIVE MG/DL
RBC # BLD AUTO: 5.33 X10(6)UL
SODIUM SERPL-SCNC: 141 MMOL/L (ref 136–145)
SP GR UR STRIP: >1.03 (ref 1–1.03)
UROBILINOGEN UR STRIP-ACNC: NORMAL
WBC # BLD AUTO: 5.3 X10(3) UL (ref 4–11)

## 2024-10-19 PROCEDURE — 96375 TX/PRO/DX INJ NEW DRUG ADDON: CPT

## 2024-10-19 PROCEDURE — 85025 COMPLETE CBC W/AUTO DIFF WBC: CPT | Performed by: EMERGENCY MEDICINE

## 2024-10-19 PROCEDURE — 99284 EMERGENCY DEPT VISIT MOD MDM: CPT

## 2024-10-19 PROCEDURE — 96374 THER/PROPH/DIAG INJ IV PUSH: CPT

## 2024-10-19 PROCEDURE — 80076 HEPATIC FUNCTION PANEL: CPT | Performed by: EMERGENCY MEDICINE

## 2024-10-19 PROCEDURE — 81003 URINALYSIS AUTO W/O SCOPE: CPT | Performed by: EMERGENCY MEDICINE

## 2024-10-19 PROCEDURE — 80048 BASIC METABOLIC PNL TOTAL CA: CPT | Performed by: EMERGENCY MEDICINE

## 2024-10-19 PROCEDURE — 99285 EMERGENCY DEPT VISIT HI MDM: CPT

## 2024-10-19 PROCEDURE — 83690 ASSAY OF LIPASE: CPT | Performed by: EMERGENCY MEDICINE

## 2024-10-19 PROCEDURE — 74177 CT ABD & PELVIS W/CONTRAST: CPT | Performed by: EMERGENCY MEDICINE

## 2024-10-19 RX ORDER — ONDANSETRON 2 MG/ML
4 INJECTION INTRAMUSCULAR; INTRAVENOUS ONCE
Status: COMPLETED | OUTPATIENT
Start: 2024-10-19 | End: 2024-10-19

## 2024-10-19 RX ORDER — VALACYCLOVIR HYDROCHLORIDE 500 MG/1
1000 TABLET, FILM COATED ORAL ONCE
Status: COMPLETED | OUTPATIENT
Start: 2024-10-19 | End: 2024-10-19

## 2024-10-19 RX ORDER — MORPHINE SULFATE 4 MG/ML
4 INJECTION, SOLUTION INTRAMUSCULAR; INTRAVENOUS ONCE
Status: COMPLETED | OUTPATIENT
Start: 2024-10-19 | End: 2024-10-19

## 2024-10-19 RX ORDER — ACETAMINOPHEN 500 MG
1000 TABLET ORAL ONCE
Status: COMPLETED | OUTPATIENT
Start: 2024-10-19 | End: 2024-10-19

## 2024-10-19 RX ORDER — VALACYCLOVIR HYDROCHLORIDE 1 G/1
1000 TABLET, FILM COATED ORAL 3 TIMES DAILY
Qty: 21 TABLET | Refills: 0 | Status: SHIPPED | OUTPATIENT
Start: 2024-10-19 | End: 2024-10-26

## 2024-10-19 RX ORDER — CETIRIZINE HYDROCHLORIDE 10 MG/1
10 TABLET ORAL ONCE
Status: COMPLETED | OUTPATIENT
Start: 2024-10-19 | End: 2024-10-19

## 2024-10-19 RX ORDER — LORATADINE 10 MG/1
10 TABLET ORAL DAILY
Qty: 30 TABLET | Refills: 0 | Status: SHIPPED | OUTPATIENT
Start: 2024-10-19 | End: 2024-11-18

## 2024-10-19 NOTE — ED INITIAL ASSESSMENT (HPI)
Pt c/o R sided abd pain x 6 days. Pt states it was relieved with a BM but is still sore. Pt denies N/V/D.   Pt also c/o R upper leg rash x2-3 days. Pt denies itching. Pt denies recent travel or fevers.

## 2024-10-21 ENCOUNTER — PATIENT MESSAGE (OUTPATIENT)
Dept: INTERNAL MEDICINE CLINIC | Facility: CLINIC | Age: 69
End: 2024-10-21

## 2024-10-21 ENCOUNTER — PATIENT OUTREACH (OUTPATIENT)
Dept: CASE MANAGEMENT | Age: 69
End: 2024-10-21

## 2024-10-21 DIAGNOSIS — R52 PAIN: Primary | ICD-10-CM

## 2024-10-21 DIAGNOSIS — Z02.9 ENCOUNTERS FOR UNSPECIFIED ADMINISTRATIVE PURPOSE: Primary | ICD-10-CM

## 2024-10-21 PROCEDURE — 1111F DSCHRG MED/CURRENT MED MERGE: CPT

## 2024-10-21 RX ORDER — HYDROCODONE BITARTRATE AND ACETAMINOPHEN 5; 325 MG/1; MG/1
1 TABLET ORAL EVERY 6 HOURS PRN
Qty: 15 TABLET | Refills: 0 | Status: SHIPPED | OUTPATIENT
Start: 2024-10-21

## 2024-10-21 NOTE — TELEPHONE ENCOUNTER
Responded to pt's uSharet message requesting stronger pain Rx for shingles pain. Gabapentin not effective thus far.    Routed message to Dr Gould/ PCP however advised that if pain severe, she may want to go to [facility] in the interim.

## 2024-10-22 ENCOUNTER — TELEPHONE (OUTPATIENT)
Dept: INTERNAL MEDICINE CLINIC | Facility: CLINIC | Age: 69
End: 2024-10-22

## 2024-10-22 NOTE — TELEPHONE ENCOUNTER
Spoke to patient for Transitions of Care call today.  Patient scheduled for emergency room/hospital follow up  on 10/29/24 but this is past the book by date of  101/26/25.    ER Follow-up appointment needed by 10/26/24.  Please advise.    BOOK BY DATE: 10/26/24    Clinical staff:  Please follow-up with provider to determine if sooner appointment can be scheduled.   Thank you!

## 2024-10-22 NOTE — PROGRESS NOTES
Transitions of Care Navigation  Discharge Date: 10/19/24  Contact Date: 10/22/2024    Transitions of Care Assessment:  ERIKA Initial Assessment    General:  Assessment completed with: Patient  Patient Subjective: Spoke with patient.  She continues to have pain in her abdomen and areas where her rash is.  She picked up both medications prescribed by emergency room and also norco that her primary care physician prescribed.  She started taking all of the medications prescribed.  Norco only relieves pain for about 3 hours.  Currently her pain is 4/10.  Pain varies throughout the day and night.  She describes as having good hours and bad hours.  Patient reports this is the worse thing that has ever happened to her. She has follow up appointment with primary care physician on 10/29/24.  This is past the book by date therefore telephone encounter will be sent to primary care physician for sooner appointment. Patient denies the following symptoms:  fever, headache, dizziness, chest pain, shortness of breath, abdominal pain, nausea or vomiting.  Remainder of medications reviewed.  Patient was advised to return to emergency room if rash begins on face. .  Chief Complaint: Rash, abdominal pain right lateral  Verify patient name and  with patient/ caregiver: Yes    Hospital Stay/Discharge:  Tell me what you understand of why you were in the hospital or emergency department: I had abdominal pain and a rash  Prior to leaving the hospital were your Discharge Instructions reviewed with you?: Yes  Did you receive a copy of your written Discharge Instructions?: Yes  What questions do you have about your Discharge Instructions?: Patient did not have any additional questions  Do you feel better or worse since you left the hospital or emergency department?: Same    Follow - Up Appointment:  Do you have a follow-up appointment?: Yes  Date: 10/29/24  Physician: Dr. Gould  Are there any barriers to getting to your follow-up appointment?:  No    Home Health/DME:  Prior to leaving the hospital was Home Health (HH) arranged for you?: N/A  Are HH needs identified by staff during the assessment?: No     Prior to leaving the hospital or emergency department was Durable Medical Equipment (DME), medical supplies, or infusions arranged for you?: N/A  Are DME/medical supply/infusions needs identified by staff during this assessment?: No     Medications/Diet:  Did any of your medications change, during or after your hospital stay or ED visit?: No  Do you understand what your medications are for and possible side effects?: Yes  Are there any reasons that keep you from taking your medication as prescribed?: No  Any concerns about medication refills?: No    Were you given a different diet per your Discharge Instructions?: No  Reason: N/A     Questions/Concerns:  Do you have any questions or concerns that have not been discussed?: No       Nursing Interventions:  Assessed pain and for other signs/symptoms. Reviewed medications. Confirmed appointment.  Sent telephone encounter to primary care physician for sooner appointment.     Medications:  Reviewed medications with patient.  Also, discussed new medication and potential side effects.  Patient did not have any additional questions.     Current Outpatient Medications   Medication Sig Dispense Refill    HYDROcodone-acetaminophen (NORCO) 5-325 MG Oral Tab Take 1 tablet by mouth every 6 (six) hours as needed for Pain. 15 tablet 0    loratadine 10 MG Oral Tab Take 1 tablet (10 mg total) by mouth daily. 30 tablet 0    valACYclovir 1 G Oral Tab Take 1 tablet (1,000 mg total) by mouth 3 (three) times daily for 7 days. 21 tablet 0    SYMBICORT 160-4.5 MCG/ACT Inhalation Aerosol INHALE 2 PUFFS INTO THE LUNGS 2 (TWO) TIMES DAILY. 10.2 g 0    ATORVASTATIN 10 MG Oral Tab Take 1 tablet (10 mg total) by mouth nightly. 90 tablet 0    ALPRAZolam 0.5 MG Oral Tab Take 1 tablet (0.5 mg total) by mouth nightly as needed for Anxiety.  30 tablet 0    AZELASTINE 0.1 % Nasal Solution Spray 2 sprays by Nasal route 2 (two) times daily. (Patient not taking: Reported on 10/22/2024) 30 mL 0    Varenicline Tartrate, Starter, (CHANTIX STARTING MONTH PAK) 0.5 MG X 11 & 1 MG X 42 Oral Tablet Therapy Pack Take 1 each by mouth daily. Days 1 to 3: 0.5 mg once daily. Days 4 to 7: 0.5 mg twice daily. Maintenance (day 8 and later): 1 mg twice daily (Patient not taking: Reported on 10/22/2024) 53 each 0         Follow-up Appointments:  Your appointments       Date & Time Appointment Department ([place])    Oct 29, 2024 11:40 AM CDT Follow Up Visit with Lb Gould MD [facility] at [address] [place] [address])    Contact your primary care provider if your insurance requires a referral.    Please arrive 15 minutes prior to your scheduled appointment. Be sure to bring your current Insurance card, Photo ID, and medication bottles or a list of your current medications.      A 24 hour notice is required to cancel any appointment or you may be charged a $40 No Show Fee.     Important: 24 hour notice is required to cancel any appointment or you may be charged a $40 No Show Fee. Please notify your physician office.               [facility] at [address] [place] [id number]  [phone]            Transitional Care Clinic  Was TCC Ordered: No      Primary Care Provider (If no TCC appointment)  Does patient already have a PCP appointment scheduled? Yes  Nurse Care Manager Confirmed PCP office ER Follow-up appointment with patient       Specialist  Does the patient have any other follow-up appointment(s) need to be scheduled? No      [x]  Patient verbally agrees to additional follow-up calls from Nurse Care Manager    Book By Date: 10/26/24

## 2024-10-29 ENCOUNTER — OFFICE VISIT (OUTPATIENT)
Dept: INTERNAL MEDICINE CLINIC | Facility: CLINIC | Age: 69
End: 2024-10-29
Payer: MEDICARE

## 2024-10-29 ENCOUNTER — PATIENT OUTREACH (OUTPATIENT)
Dept: CASE MANAGEMENT | Age: 69
End: 2024-10-29

## 2024-10-29 VITALS
HEIGHT: 65 IN | WEIGHT: 168 LBS | HEART RATE: 84 BPM | BODY MASS INDEX: 27.99 KG/M2 | DIASTOLIC BLOOD PRESSURE: 82 MMHG | OXYGEN SATURATION: 98 % | TEMPERATURE: 98 F | SYSTOLIC BLOOD PRESSURE: 118 MMHG

## 2024-10-29 DIAGNOSIS — B02.8 HERPES ZOSTER WITH COMPLICATION: Primary | ICD-10-CM

## 2024-10-29 DIAGNOSIS — J44.9 CHRONIC OBSTRUCTIVE PULMONARY DISEASE, UNSPECIFIED COPD TYPE (HCC): ICD-10-CM

## 2024-10-29 DIAGNOSIS — F41.1 GENERALIZED ANXIETY DISORDER: ICD-10-CM

## 2024-10-29 DIAGNOSIS — F41.0 PANIC ATTACKS: ICD-10-CM

## 2024-10-29 PROCEDURE — 3079F DIAST BP 80-89 MM HG: CPT | Performed by: FAMILY MEDICINE

## 2024-10-29 PROCEDURE — G2211 COMPLEX E/M VISIT ADD ON: HCPCS | Performed by: FAMILY MEDICINE

## 2024-10-29 PROCEDURE — 1159F MED LIST DOCD IN RCRD: CPT | Performed by: FAMILY MEDICINE

## 2024-10-29 PROCEDURE — 99214 OFFICE O/P EST MOD 30 MIN: CPT | Performed by: FAMILY MEDICINE

## 2024-10-29 PROCEDURE — 3074F SYST BP LT 130 MM HG: CPT | Performed by: FAMILY MEDICINE

## 2024-10-29 PROCEDURE — 3008F BODY MASS INDEX DOCD: CPT | Performed by: FAMILY MEDICINE

## 2024-10-29 PROCEDURE — 1160F RVW MEDS BY RX/DR IN RCRD: CPT | Performed by: FAMILY MEDICINE

## 2024-10-29 RX ORDER — PREGABALIN 50 MG/1
50 CAPSULE ORAL 3 TIMES DAILY
Qty: 180 CAPSULE | Refills: 3 | Status: SHIPPED | OUTPATIENT
Start: 2024-10-29

## 2024-10-29 RX ORDER — PREDNISONE 20 MG/1
40 TABLET ORAL DAILY
Qty: 10 TABLET | Refills: 0 | Status: SHIPPED | OUTPATIENT
Start: 2024-10-29

## 2024-10-29 NOTE — ASSESSMENT & PLAN NOTE
Patient with generalized anxiety disorder.  Also intermittent panic attacks and phobias.  She is overall controlling her anxiety but does note that she might be interested in long-term management of anxiety with medications.  Overall controlled for now.  Has alprazolam to use sparingly as needed for panic attacks-she has alprazolam 0.5 mg as needed #30 to use over the course of 1 year.  Last refill provided June 28, 2023 for 1 year supply. If needing refill, can message me for another one year supply when needing.

## 2024-10-29 NOTE — ASSESSMENT & PLAN NOTE
Patient with shingles seen in the right posterior hip and down the right leg  Significant neuralgia.   Advised using norco as needed - if needing another 15 tablet refill, can contact me.  Start pregabalin 50 mg 3 times daily.  If with some improvement in pain, will increase dosage, can contact me.   Can try prednisone 40 mg daily to see if this helps.  Continue tylenol and advil as needed for pain.  If with persistence of pain needing longer term management, may benefit from 1 month follow up

## 2024-10-29 NOTE — PATIENT INSTRUCTIONS
PATIENT INSTRUCTIONS    Thank you for seeing me today, it was a pleasure taking care of you.  Please check out at the  and schedule a follow up appointment.  Return in about 6 months (around 4/29/2025) for medicare visit.  Please remember that the preferred nona period for appointments is 5 minutes. This is to help maximize the amount of time that we can spend together at our visits.    Norco as needed   Pregabalin 50 mg 3 times daily  Prednisone 40 mg daily for 5 days  Lung screen  DEXA bone density  Mammogram  Please call [phone] to schedule your imaging appointment.     Dr. Luis Fernando Correa

## 2024-10-29 NOTE — PROGRESS NOTES
FAMILY MEDICINE CLINIC NOTE    HPI  Angela Way is a 69 year old female presenting for     #Shingles  #ER follow up 10/19  -valacyclovir - completed  -reports rash no longer spreading  -rash is painful  -pain is all associated with rash, no further abdominal pain  -has norco to use as needed - has only used a few times, only helpful for a few hours  -tried gabapentin 300 mg from her friend, was not helpful  -desiring stronger medication  -unable to sleep    #COPD  -PFT 6/2023  -symbicort 160-4.5 mcg 2 puffs twice daily      #Cigarette smoker  -20 pack year smoking history  -never started the chantix      #Anxiety  -notes phobia of driving as well  -notes white coat syndrome  -notes anxiety at baseline  -can typically talk herself down  -denies depression  -PHQ2 score of 0  -GAD7 score of 7, not difficulty  -no SI/HI  -no AVH  -no significant trauma  -denies symptoms of lane  -has seen therapy in the past  -has alprazolam to use as needed sparingly  -reports anxiety is controlled - has alprazolam #30 for entire year, prescribed 6/28/23  -anxiety is still managed for now    ---other    #Hgb   -CBC Hgb 17.0, 16.9    #HLD  -atorvastatin 10 mg nightly  -no issues     #Prediabetes  -Hba1c 5.8     #Cerumen impaction  -using debrox, still feels clogged  -resolved - saw Dr Ry ALMARAZ  GENERAL: No fever/chills, no recent weight loss  HEENT: No visual changes, no changes in hearing, no sore throats  NECK: No pain, no swelling  RESP: No cough, no SOB  CV: No chest pain, no palpitations  GI: No abd pain, no N/V/D  MSK: No edema  SKIN: No new rashes  NEURO: No numbness, no tingling, no headaches    HEALTH MAINTENANCE  Health Maintenance Topics with due status: Overdue       Topic Date Due    Zoster Vaccines Never done    Lung Cancer Screening Never done    DEXA Scan Never done    COVID-19 Vaccine 09/01/2024    Influenza Vaccine Never done     Health Maintenance Topics with due status: Due Soon       Topic Date Due     Mammogram 11/14/2024       ALLERGIES  Allergies[1]    MEDICATIONS  Current Outpatient Medications   Medication Sig Dispense Refill    pregabalin (LYRICA) 50 MG Oral Cap Take 1 capsule (50 mg total) by mouth 3 (three) times daily. 180 capsule 3    predniSONE 20 MG Oral Tab Take 2 tablets (40 mg total) by mouth daily. 10 tablet 0    HYDROcodone-acetaminophen (NORCO) 5-325 MG Oral Tab Take 1 tablet by mouth every 6 (six) hours as needed for Pain. 15 tablet 0    SYMBICORT 160-4.5 MCG/ACT Inhalation Aerosol INHALE 2 PUFFS INTO THE LUNGS 2 (TWO) TIMES DAILY. 10.2 g 0    ATORVASTATIN 10 MG Oral Tab Take 1 tablet (10 mg total) by mouth nightly. 90 tablet 0    AZELASTINE 0.1 % Nasal Solution Spray 2 sprays by Nasal route 2 (two) times daily. (Patient not taking: Reported on 10/22/2024) 30 mL 0    Varenicline Tartrate, Starter, (CHANTIX STARTING MONTH PAK) 0.5 MG X 11 & 1 MG X 42 Oral Tablet Therapy Pack Take 1 each by mouth daily. Days 1 to 3: 0.5 mg once daily. Days 4 to 7: 0.5 mg twice daily. Maintenance (day 8 and later): 1 mg twice daily (Patient not taking: Reported on 10/22/2024) 53 each 0    ALPRAZolam 0.5 MG Oral Tab Take 1 tablet (0.5 mg total) by mouth nightly as needed for Anxiety. 30 tablet 0       ACTIVE PROBLEMS  Patient Active Problem List   Diagnosis    Generalized anxiety disorder    Bilateral impacted cerumen    Health maintenance examination    Cigarette smoker    Panic attacks    Pure hypercholesterolemia    Prediabetes    Elevated hemoglobin (HCC)    Chronic obstructive pulmonary disease (HCC)    Overweight (BMI 25.0-29.9)    Herpes zoster with complication       PAST MEDICAL HISTORY  Past Medical History:    Chronic obstructive pulmonary disease (HCC)    Generalized anxiety disorder    Pure hypercholesterolemia    Rheumatic fever in pediatric patient       PAST SOCIAL HISTORY  Social History     Socioeconomic History    Marital status:      Spouse name: Not on file    Number of children: Not  on file    Years of education: Not on file    Highest education level: Not on file   Occupational History    Not on file   Tobacco Use    Smoking status: Every Day     Current packs/day: 1.00     Average packs/day: 1 pack/day for 50.8 years (50.8 ttl pk-yrs)     Types: Cigarettes     Start date:     Smokeless tobacco: Never   Vaping Use    Vaping status: Former    Substances: THC, CBD   Substance and Sexual Activity    Alcohol use: Not Currently    Drug use: Yes     Types: Cannabis    Sexual activity: Not Currently     Partners: Male   Other Topics Concern    Caffeine Concern Not Asked    Exercise Not Asked    Seat Belt Not Asked    Special Diet Not Asked    Stress Concern Not Asked    Weight Concern Not Asked   Social History Narrative    Relationships:  - Jean Paul    Children: Son - Efe (adult)    Pets: Dog - passed away. Got another dog.     School: N/A    Work: Retired - previously a dental .     Origin: Grew up in the [facility]. Parents from [place]    Interests: Shopping - [employer]. Reading. Cleaning.     Spiritual: Not Bahai, not spiritual         Social Drivers of Health     Financial Resource Strain: Low Risk  (10/22/2024)    Financial Resource Strain     Difficulty of Paying Living Expenses: Not on file     Med Affordability: No   Food Insecurity: Not on file   Transportation Needs: No Transportation Needs (10/22/2024)    Transportation Needs     Lack of Transportation: No     Car Seat: Not on file   Physical Activity: Not on file   Stress: Not on file   Social Connections: Not on file   Housing Stability: Not on file       PAST SURGICAL HISTORY  Past Surgical History:   Procedure Laterality Date             PAST FAMILY HISTORY  Family History   Problem Relation Age of Onset    Heart Disease Mother     Diabetes Mother     Cancer Father         Liver    Diabetes Father     No Known Problems Sister     No Known Problems Sister     No Known Problems Maternal  Grandmother     No Known Problems Maternal Grandfather     No Known Problems Paternal Grandmother     No Known Problems Paternal Grandfather     Breast Cancer Paternal Aunt     Colon Cancer Neg          PHYSICAL EXAM  Vitals:    10/29/24 1136   BP: 118/82   Pulse: 84   Temp: 98.3 °F (36.8 °C)   SpO2: 98%   Weight: 168 lb (76.2 kg)   Height: 5' 5\" (1.651 m)      Body mass index is 27.96 kg/m².    GENERAL: Mild distress  RESP: Non-labored respirations, CTAB, no wheezing, no rales, no rhonchi  CV: RRR, no murmurs  MSK: No edema  SKIN: right posterior buttock and right anterior/medial thigh with pink excoriated scabbed over papules  NEURO: Answering questions appropriately    LABS  Lab Results   Component Value Date    WBC 5.3 10/19/2024    HGB 16.9 (H) 10/19/2024    HCT 48.1 (H) 10/19/2024    .0 10/19/2024    NEPERCENT 65.3 10/19/2024    LYPERCENT 21.5 10/19/2024    MOPERCENT 11.1 10/19/2024    EOPERCENT 0.9 10/19/2024    BAPERCENT 0.8 10/19/2024    NE 3.46 10/19/2024    LYMABS 1.14 10/19/2024    MOABSO 0.59 10/19/2024    EOABSO 0.05 10/19/2024    BAABSO 0.04 10/19/2024       Lab Results   Component Value Date     10/19/2024    K 3.9 10/19/2024     10/19/2024    CO2 23.0 10/19/2024    ANIONGAP 8 10/19/2024    BUN 9 10/19/2024    CREATSERUM 0.63 10/19/2024    BUNCREA 14.3 10/19/2024     (H) 10/19/2024    CA 9.7 10/19/2024    OSMOCALC 291 10/19/2024    ALT 14 10/19/2024    AST 19 10/19/2024    ALKPHO 93 10/19/2024    BILT 1.2 (H) 10/19/2024    TP 7.6 10/19/2024    ALB 5.1 (H) 10/19/2024    GLOBULIN 2.5 04/24/2024    ELECTAG 1.1 05/15/2023    FASTING No 05/15/2023    FASTING No 05/15/2023         Lab Results   Component Value Date    CHOLEST 166 04/24/2024    TRIG 87 04/24/2024    HDL 71 04/24/2024    LDL 78 04/24/2024    VLDL 16 05/15/2023    TCHDLRATIO 2.3 04/24/2024    NONHDLC 95 04/24/2024        DIAGNOSTICS      ASSESSMENT/PLAN  Problem List Items Addressed This Visit          HCC Problems     Chronic obstructive pulmonary disease (HCC)     She reports breathing recently slightly worse.  Continue Symbicort  Needs to quit smoking         Relevant Medications    predniSONE 20 MG Oral Tab       Mental Health    Generalized anxiety disorder     Patient with generalized anxiety disorder.  Also intermittent panic attacks and phobias.  She is overall controlling her anxiety but does note that she might be interested in long-term management of anxiety with medications.  Overall controlled for now.  Has alprazolam to use sparingly as needed for panic attacks-she has alprazolam 0.5 mg as needed #30 to use over the course of 1 year.  Last refill provided June 28, 2023 for 1 year supply. If needing refill, can message me for another one year supply when needing.          Panic attacks     Patient with generalized anxiety disorder.  Also intermittent panic attacks and phobias.  She is overall controlling her anxiety but does note that she might be interested in long-term management of anxiety with medications.  Overall controlled for now.  Has alprazolam to use sparingly as needed for panic attacks-she has alprazolam 0.5 mg as needed #30 to use over the course of 1 year.  Last refill provided June 28, 2023 for 1 year supply. If needing refill, can message me for another one year supply when needing.             Infectious Diseases    Herpes zoster with complication - Primary     Patient with shingles seen in the right posterior hip and down the right leg  Significant neuralgia.   Advised using norco as needed - if needing another 15 tablet refill, can contact me.  Start pregabalin 50 mg 3 times daily.  If with some improvement in pain, will increase dosage, can contact me.   Can try prednisone 40 mg daily to see if this helps.  Continue tylenol and advil as needed for pain.  If with persistence of pain needing longer term management, may benefit from 1 month follow up         Relevant Medications    pregabalin  (LYRICA) 50 MG Oral Cap    predniSONE 20 MG Oral Tab       Return in about 6 months (around 2025) for medicare visit.    No topic due editable text     Lb Gould MD  Family Medicine           Pre-chartin minutes  Reviewing/obtaining: 10 minutes  Medical Exam:1 minutes  Counseling/education: 5 minutes  Notes: 5 minutes  Referring/communicatin minutes  Care coordination: 0 minutes    My total time spent caring for the patient on the day of the encounter: 23 minutes         [1]   Allergies  Allergen Reactions    Codeine NAUSEA AND VOMITING

## 2024-10-30 NOTE — PROGRESS NOTES
Patient  seen primary care physician yesterday.  Herpes zoster with complication - Primary        Patient with shingles seen in the right posterior hip and down the right leg  Significant neuralgia.   Advised using norco as needed - if needing another 15 tablet refill, can contact me.  Start pregabalin 50 mg 3 times daily.  If with some improvement in pain, will increase dosage, can contact me.   Can try prednisone 40 mg daily to see if this helps.  Continue tylenol and advil as needed for pain.  If with persistence of pain needing longer term management, may benefit from 1 month follow up                   ERIKA Follow-up Assessment    General:       Progress/Care Plan:  Is the patient progressing as planned?: Yes  Care Plan Update: Spoke with patient. She picked up medications prescribed by primary care physician and started taking them.  She has seen a great improvement in her pain already.  She has not experienced any side effects from them.  She has not taking any norco today.  Currently her pain is 2-3/10.  Her rash is drying out, no longer feels hot or has a burning sensation.  Patient did not have any additional questions or concerns.  New Care Plan: Continue pregabalin 50 mg TID, finish prednisone  Frequency/Follow Up Plan: 1 week     Notes:  Navigator Notes: pain

## 2024-10-31 DIAGNOSIS — J44.9 CHRONIC OBSTRUCTIVE PULMONARY DISEASE, UNSPECIFIED COPD TYPE (HCC): ICD-10-CM

## 2024-10-31 RX ORDER — BUDESONIDE AND FORMOTEROL FUMARATE DIHYDRATE 160; 4.5 UG/1; UG/1
2 AEROSOL RESPIRATORY (INHALATION) 2 TIMES DAILY
Qty: 10.2 G | Refills: 3 | Status: SHIPPED | OUTPATIENT
Start: 2024-10-31

## 2024-11-04 ENCOUNTER — PATIENT MESSAGE (OUTPATIENT)
Dept: INTERNAL MEDICINE CLINIC | Facility: CLINIC | Age: 69
End: 2024-11-04

## 2024-11-06 ENCOUNTER — PATIENT OUTREACH (OUTPATIENT)
Dept: CASE MANAGEMENT | Age: 69
End: 2024-11-06

## 2024-11-06 NOTE — PROGRESS NOTES
ERIKA Follow-up Assessment    General:  Assessment completed with: Patient  Community Resources: Other (n/A)    Progress/Care Plan:  Is the patient progressing as planned?: Yes  Care Plan Update: Spoke with patient. Her shingles rash continues to heal.  She does have some discomfort but that is tolerable.  She is now having lower back pain that is 8/10.  She has been taking advil 400 mg BID.  She is out of norco and finished prednisone. She increased her pregablin to 100 mg TID as advised by Dr. Gould.  Patient informed she can take 600-800 mg of advil every 8 hours as needed.  She will try this.  Yesterday she did not sit in her recliner much and tried to do things around the house. She has not back pain when she is lying down.She is unable to apply a heating pad as one of her shingles clusters in on the lower back.  She denies urinary frequency, urgency or pain with urination. She also feels constipated.  She increased her benefiber to TID. She is incorporating more fruits and vegetables in her diet.  Patient is not a big water drinker but tries to get as much as she can.  Patient advised goal for water is 64 oz per day. Patient did not have any additional questions or concerns.  New Care Plan: Begin increased dose of advil, stay active, f/u with provider with update by end of week  Frequency/Follow Up Plan: 1 week     Notes:  Navigator Notes: continue prebalin, assess pain, rash

## 2024-11-13 ENCOUNTER — PATIENT OUTREACH (OUTPATIENT)
Dept: CASE MANAGEMENT | Age: 69
End: 2024-11-13

## 2024-11-13 NOTE — PROGRESS NOTES
ERIKA Follow-up Assessment    General:  Assessment completed with: Patient  Community Resources: Other (n/a)    Progress/Care Plan:  Is the patient progressing as planned?: Yes  Care Plan Update: Spoke with patient she continues to have back pain.  The pain is worse in the morning. Currently pain is  5/10 and she just took a norco @ 1 pm. Prior to that it was 8/10.  Primary care physician wants to see patient in the office for re-evaluation if pain continues after this prescription.  Patient only takes 1 tablet daily if needed.  Follow up appointment scheduled for 11/26. Patient continues to take pregabalin 100 mg TID. Patient did not have any additional questions or concerns.  New Care Plan: continue norco as needed, continue moving  Frequency/Follow Up Plan: 1 week     Notes:  Navigator Notes: pain?

## 2024-11-19 ENCOUNTER — PATIENT OUTREACH (OUTPATIENT)
Dept: CASE MANAGEMENT | Age: 69
End: 2024-11-19

## 2024-11-19 NOTE — PROGRESS NOTES
Dear Lb Gould MD,    My name is Sharri Lea RN and I am this patient's Transition of Care Navigator.     Thank you for allowing me to participate in your patient's care over the past 30 days.     The goal of this program is to assist individuals in meeting their health care needs post discharge.  This is done by providing consistent care coordination and connecting patients with needed resources throughout this episode of care. I'd like to inform you that your patient Angela Way has completed the 30 day ERIKA navigation program as of today.     In discussion with the patient/family contact, I reviewed their providers and ongoing support contacts for the future.     Areas of need: back pain resolved   Areas of progress:  shingles have resolved, burning pain has resolved     It has been a pleasure communicating and working with you and thank you for allowing me to participate in the care of your patient.     If for any reason you have questions about the program, please feel free to contact me at the information below.      Sincerely,    Sharri Lea RN      ERIKA Graduation Assessment    General:  Assessment completed with: Patient  Community Resources: Other (n/a)    Care Plan/Instructions:   Care Plan Summary (Recap of navigation period including # of ED & Hospital Admission, and if goals met or unmet): Spoke with patient she reports her back pain is beginning to improve.  When she wakes up in the morning that is when it is the worse at 7/10, as the day goes on her pain decreases and usually is 3/10.  She is only taking norco once daily.  Patient aware this is the last outreach call with nurse care manager as TRANSITIONS OF CARE program has ended.  Patient did not have any additional questions or concerns.   Primary care physician updated.         Navigation period 10/22 - 11/19.  One emergency room visit on 10/19 that initiated ERIKA.  Goals have been met.  Patient Graduation Instructions (Ongoing  barriers to care identified, Areas of Need, Areas of Progress): Barriers:  none   Needs:  back pain resolved  Progress:  shingles have resolved, burning pain has resolved

## 2024-11-29 DIAGNOSIS — E78.00 PURE HYPERCHOLESTEROLEMIA: ICD-10-CM

## 2024-11-29 RX ORDER — ATORVASTATIN CALCIUM 10 MG/1
10 TABLET, FILM COATED ORAL NIGHTLY
Qty: 90 TABLET | Refills: 0 | Status: SHIPPED | OUTPATIENT
Start: 2024-11-29

## 2024-12-01 DIAGNOSIS — B02.8 HERPES ZOSTER WITH COMPLICATION: ICD-10-CM

## 2024-12-01 RX ORDER — PREGABALIN 50 MG/1
50 CAPSULE ORAL 3 TIMES DAILY
Qty: 180 CAPSULE | Refills: 3 | Status: CANCELLED | OUTPATIENT
Start: 2024-12-01

## 2024-12-02 RX ORDER — PREGABALIN 100 MG/1
100 CAPSULE ORAL 3 TIMES DAILY
Qty: 270 CAPSULE | Refills: 1 | Status: SHIPPED | OUTPATIENT
Start: 2024-12-02 | End: 2025-02-27

## 2025-01-20 DIAGNOSIS — F41.0 PANIC ATTACKS: ICD-10-CM

## 2025-01-20 RX ORDER — ALPRAZOLAM 0.5 MG
0.5 TABLET ORAL NIGHTLY PRN
Qty: 30 TABLET | Refills: 0 | Status: SHIPPED | OUTPATIENT
Start: 2025-01-20

## 2025-02-13 DIAGNOSIS — J44.9 CHRONIC OBSTRUCTIVE PULMONARY DISEASE, UNSPECIFIED COPD TYPE (HCC): ICD-10-CM

## 2025-02-13 RX ORDER — BUDESONIDE AND FORMOTEROL FUMARATE DIHYDRATE 160; 4.5 UG/1; UG/1
2 AEROSOL RESPIRATORY (INHALATION) 2 TIMES DAILY
Qty: 10.2 G | Refills: 0 | Status: SHIPPED | OUTPATIENT
Start: 2025-02-13

## 2025-02-25 DIAGNOSIS — E78.00 PURE HYPERCHOLESTEROLEMIA: ICD-10-CM

## 2025-02-25 RX ORDER — ATORVASTATIN CALCIUM 10 MG/1
10 TABLET, FILM COATED ORAL NIGHTLY
Qty: 90 TABLET | Refills: 0 | Status: SHIPPED | OUTPATIENT
Start: 2025-02-25

## 2025-02-27 ENCOUNTER — PATIENT MESSAGE (OUTPATIENT)
Dept: INTERNAL MEDICINE CLINIC | Facility: CLINIC | Age: 70
End: 2025-02-27

## 2025-02-27 DIAGNOSIS — B02.8 HERPES ZOSTER WITH COMPLICATION: ICD-10-CM

## 2025-02-27 RX ORDER — PREGABALIN 100 MG/1
100 CAPSULE ORAL 3 TIMES DAILY
Qty: 270 CAPSULE | Refills: 1 | Status: SHIPPED | OUTPATIENT
Start: 2025-02-27

## 2025-03-07 ENCOUNTER — APPOINTMENT (OUTPATIENT)
Dept: GENERAL RADIOLOGY | Age: 70
End: 2025-03-07
Payer: MEDICARE

## 2025-03-07 ENCOUNTER — APPOINTMENT (OUTPATIENT)
Dept: ULTRASOUND IMAGING | Age: 70
End: 2025-03-07
Payer: MEDICARE

## 2025-03-07 ENCOUNTER — HOSPITAL ENCOUNTER (OUTPATIENT)
Age: 70
Discharge: HOME | End: 2025-03-07
Payer: MEDICARE

## 2025-03-07 VITALS
SYSTOLIC BLOOD PRESSURE: 139 MMHG | HEART RATE: 92 BPM | RESPIRATION RATE: 12 BRPM | DIASTOLIC BLOOD PRESSURE: 62 MMHG | OXYGEN SATURATION: 99 % | TEMPERATURE: 98 F

## 2025-03-07 DIAGNOSIS — M25.572 ACUTE LEFT ANKLE PAIN: ICD-10-CM

## 2025-03-07 DIAGNOSIS — S82.839A CLOSED FRACTURE OF DISTAL END OF FIBULA, UNSPECIFIED FRACTURE MORPHOLOGY, INITIAL ENCOUNTER: Primary | ICD-10-CM

## 2025-03-07 DIAGNOSIS — M79.89 LEFT LEG SWELLING: ICD-10-CM

## 2025-03-07 PROCEDURE — 73630 X-RAY EXAM OF FOOT: CPT

## 2025-03-07 PROCEDURE — 93971 EXTREMITY STUDY: CPT

## 2025-03-07 PROCEDURE — 29515 APPLICATION SHORT LEG SPLINT: CPT

## 2025-03-07 PROCEDURE — 73610 X-RAY EXAM OF ANKLE: CPT

## 2025-03-07 PROCEDURE — 73650 X-RAY EXAM OF HEEL: CPT

## 2025-03-07 PROCEDURE — 99213 OFFICE O/P EST LOW 20 MIN: CPT

## 2025-03-07 NOTE — ED PROVIDER NOTES
Patient Seen in: [facility]      History     Chief Complaint   Patient presents with    Leg or Foot Injury     Entered by patient     Stated Complaint: Leg or Foot Injury  Subjective:   Zechariah is a 69-year-old female presenting to the immediate care complaining of pain to her left foot and ankle.  Patient states 3 days ago she had a mechanical fall when she was walking and landed on her buttocks.  States that her left foot ended up underneath her with the fall.  Patient had pain initially after the fall and she has been resting and elevating the foot.  Patient states that the pain is still there but much better but she is now noticing a lot of bruising and swelling to the area.  Patient denies any weakness, numbness, tingling.  Denies any other injury or trauma.  States she is otherwise been feeling well.  Patient denies any other concerns or complaints.        Objective:   No pertinent past medical history.          No pertinent past surgical history.            No pertinent social history.          Review of Systems    Positive for stated complaint: Leg or Foot Injury (Entered by patient)    Other systems are as noted in HPI.  Constitutional and vital signs reviewed.      All other systems reviewed and negative except as noted above.    Physical Exam     ED Triage Vitals [03/07/25 1324]   /62   Pulse 92   Resp 12   Temp 97.5 °F (36.4 °C)   Temp src Oral   SpO2 99 %   O2 Device None (Room air)     Current:/62   Pulse 92   Temp 97.5 °F (36.4 °C) (Oral)   Resp 12   SpO2 99%     Physical Exam  Vitals and nursing note reviewed.   Constitutional:       General: She is not in acute distress.     Appearance: Normal appearance. She is not ill-appearing, toxic-appearing or diaphoretic.   HENT:      Head: Normocephalic.   Cardiovascular:      Rate and Rhythm: Normal rate.   Pulmonary:      Effort: Pulmonary effort is normal.   Musculoskeletal:         General: Normal range of motion.       Cervical back: Normal range of motion.      Left lower leg: Swelling present. No deformity, lacerations, tenderness or bony tenderness. Edema present.      Right ankle: Normal.      Left ankle: Swelling and ecchymosis present. No deformity or lacerations. Tenderness present over the lateral malleolus. Normal range of motion. Normal pulse.      Left Achilles Tendon: Normal.      Right foot: Normal.      Left foot: Normal range of motion and normal capillary refill. Swelling, tenderness and bony tenderness present. No deformity, bunion, laceration or crepitus. Normal pulse.      Comments: Positive CMS.  2+ DP and PT pulses. Capillary refill is less than 2 seconds.  Patient does have full range of motion to the entire left ankle, foot, and all 5 toes.  The left knee, thigh, and hip are unremarkable.  There are no abrasions or lacerations noted.  Scattered ecchymosis noted to the entire foot, heel and ankle.  Patient does have tenderness to lateral malleolus and left 5th metatarsal.  Swelling to the entire left foot, ankle and calf.  There is no erythema or warmth noted.  No calf pain, warmth, erythema or tenderness. No obvious deformity noted.  No signs or symptoms of cellulitis.     Skin:     General: Skin is warm and dry.      Capillary Refill: Capillary refill takes less than 2 seconds.   Neurological:      General: No focal deficit present.      Mental Status: She is alert and oriented to person, place, and time.   Psychiatric:         Mood and Affect: Mood normal.         Behavior: Behavior normal.         Thought Content: Thought content normal.         Judgment: Judgment normal.         ED Course   Radiology:    US VENOUS DOPPLER LEG LEFT - DIAG IMG (CPT=93971)   Final Result   PROCEDURE: US VENOUS DOPPLER LEG LEFT-DIAG IMG (CPT=93971)       COMPARISON: None.       INDICATIONS: Left leg swelling       TECHNIQUE: Color duplex Doppler venous ultrasound of the left lower    extremity was performed in the usual  manner.       FINDINGS: The femoral and popliteal veins appear normal.  Normal flow was    demonstrated with color and pulsed Doppler.  Visualized portions of the    great and small saphenous, posterior tibial, and peroneal veins appear    normal.         THROMBI: None visible.   COMPRESSIBILITY: Normal.   OTHER: Negative.                   =====   CONCLUSION: No left lower extremity DVT.               Dictated by (CST): Rosendo Hernandez MD on 3/07/2025 at 2:52 PM        Finalized by (CST): Rosendo Hernandez MD on 3/07/2025 at 2:53 PM               XR HEEL (CALCANEUS) (MIN 2 VIEWS), LEFT (CPT=73650)   Final Result   PROCEDURE: XR HEEL (CALCANEUS) (MIN 2 VIEWS), LEFT (CPT=73650)       COMPARISON: [facility], XR FOOT, COMPLETE (MIN 3 VIEWS),    LEFT (CPT=73630), 3/07/2025, 1:55 PM.       INDICATIONS: Pain and swelling of foot and heel from fall x3 days ago.       TECHNIQUE: 2 views were obtained.         FINDINGS:                    =====   CONCLUSION:        Dictated under concurrently performed foot radiographs                Dictated by (CST): Avi Webster MD on 3/07/2025 at 2:59 PM        Finalized by (CST): Avi Webster MD on 3/07/2025 at 3:01 PM               XR ANKLE (MIN 3 VIEWS), LEFT (CPT=73610)   Final Result   PROCEDURE: XR ANKLE (MIN 3 VIEWS), LEFT (CPT=73610)       COMPARISON: [facility], XR FOOT, COMPLETE (MIN 3 VIEWS),    LEFT (CPT=73630), 3/07/2025, 1:55 PM.       INDICATIONS: Pain and swelling of left ankle from fall x3 days ago.       TECHNIQUE: Four views obtained.       FINDINGS:                    =====   CONCLUSION:    Oblique minimally displaced fracture of the distal fibula extending into    the lateral malleolus.       Ankle mortise maintained.       Soft tissue swelling throughout the ankle                Dictated by (CST): Avi Webster MD on 3/07/2025 at 2:57 PM        Finalized by (CST): Avi Webster MD on 3/07/2025 at 2:58 PM               XR FOOT, COMPLETE  (MIN 3 VIEWS), LEFT (CPT=73630)   Final Result   PROCEDURE: XR FOOT, COMPLETE (MIN 3 VIEWS), LEFT (CPT=73630)       COMPARISON: [facility], XR HEEL (CALCANEUS) (MIN 2 VIEWS),    LEFT (CPT=73650), 3/07/2025, 1:55 PM.       INDICATIONS: Pain and swelling of left foot from fall x3 days ago.       TECHNIQUE: 3 views were obtained.         FINDINGS:    BONES: Mild joint space narrowing with small osteophytes at the first MTP    joint. Degenerative changes are seen throughout the PIP and DIP joints.     Prior osteotomy of the 4th metatarsal head. Well-corticated osseous    density is seen adjacent to the medial    malleolus, likely sequela of remote trauma. The joint spaces are otherwise    maintained.   SOFT TISSUES: Soft tissue swelling seen throughout the foot.   EFFUSION: None visible.    OTHER: Negative.                    =====   CONCLUSION:        Degenerative changes and soft tissue swelling without acute fracture or    dislocation within the foot.                  Dictated by (CST): Avi Webster MD on 3/07/2025 at 2:58 PM        Finalized by (CST): Avi Webster MD on 3/07/2025 at 2:59 PM                 Labs Reviewed - No data to display    MDM     Medical Decision Making  Differential diagnoses reflecting the complexity of care include but are not limited to fracture, sprain, DVT.    Comorbidities that add complexity to management include: None  History obtained by an independent source was from: Patient  My independent interpretations of studies include: X-ray  Patient is well appearing, non-toxic and in no acute distress.  Vital signs are stable.     There is an acute distal fibula on x-ray.  Foot and heel x-ray were normal.  Due to the significant swelling and bruising a DVT study was done that was negative.  Short leg splint in the immediate care.  I discussed with the patient that she needs to be nonweightbearing at home.  Patient normally walks with a cane and states that she has a walker at  home.  The patient and I do not believe that she would do well with crutches so she will use the walker when she gets home.   Patient is neurovascularly intact.  Compartments are soft.  After application of the splint I returned and re-examined the patient. The splint was adequately immobilizing the joint and distal to the splint the patient's circulation and sensation was intact.   Recommended using Tylenol and Motrin for pain.  Recommended that if the patient develop any numbness, tingling, acutely worsening pain, swelling or any other concerns or complaints they go to the emergency department.  Recommended that the patient make an appointment to follow-up with the orthopedic specialist.  Recommended no gym or sports until follow-up with specialist, note provided.  Also recommended follow-up with primary care doctor.       ED precautions discussed.  Patient (guardian) advised to follow up with PCP in 2-3 days.  Patient (guardian) agrees with this plan of care.  Patient (guardian) verbalizes understanding of discharge instructions and plan of care.      Amount and/or Complexity of Data Reviewed  Radiology: ordered and independent interpretation performed. Decision-making details documented in ED Course.    Risk  OTC drugs.        Disposition and Plan     Clinical Impression:  1. Closed fracture of distal end of fibula, unspecified fracture morphology, initial encounter    2. Left leg swelling    3. Acute left ankle pain         Disposition:  Discharge  3/7/2025  3:14 pm    Follow-up:  Abrahan Wynn MD  [address] [address]  [phone]          Trina Holguin PA  [facility] [place] [id number]  [phone]                Medications Prescribed:  Discharge Medication List as of 3/7/2025  3:22 PM

## 2025-03-07 NOTE — ED INITIAL ASSESSMENT (HPI)
Pt reports slipping and fell Tuesday landing on her bottom; states left foot got stuck underneath her. C/o left foot/ankle pain +swelling/bruising. Ambulatory.

## 2025-03-07 NOTE — DISCHARGE INSTRUCTIONS
There is a fracture of the left outer ankle on x-ray.     Please wear the splint and use a walker that we placed in the immediate care.    DO NOT BEAR WEIGHT ON THE LEFT ANKLE  You can take Tylenol and Motrin for pain.    Rest, ice and elevate.    Make an appointment to follow-up with the orthopedic specialist.      If the patient develops any numbness, tingling, acutely worsening pain, swelling or any other concerns or complaints they go to the emergency department.    Otherwise follow-up with primary care doctor.

## 2025-03-10 ENCOUNTER — TELEPHONE (OUTPATIENT)
Dept: PODIATRY CLINIC | Facility: CLINIC | Age: 70
End: 2025-03-10

## 2025-03-10 NOTE — TELEPHONE ENCOUNTER
Spoke with patient and she states she fell down the stairs on 3/4/25. She went to  on 3/7/25 and was told left ankle fx. She states UC put in temp splint and the end is sharp. I did suggest covering ends with medical tape and covering with cloth like washcloth and she confirmed she is already doing this and has no concerns. Appointment scheduled on 3/12 at 930am with Dr Galindo at [facility]. Address provided.

## 2025-03-10 NOTE — TELEPHONE ENCOUNTER
New patient referred by IC for left ankle fracture. Imaging in chart. Patient states she was put in a temporary cast that is \"very sharp\" and she would like to be seen asap.    Please advise.

## 2025-03-12 ENCOUNTER — TELEPHONE (OUTPATIENT)
Dept: INTERNAL MEDICINE CLINIC | Facility: CLINIC | Age: 70
End: 2025-03-12

## 2025-03-12 ENCOUNTER — OFFICE VISIT (OUTPATIENT)
Dept: PODIATRY CLINIC | Facility: CLINIC | Age: 70
End: 2025-03-12
Payer: MEDICARE

## 2025-03-12 DIAGNOSIS — S82.822A CLOSED TORUS FRACTURE OF DISTAL END OF LEFT FIBULA, INITIAL ENCOUNTER: Primary | ICD-10-CM

## 2025-03-12 DIAGNOSIS — S82.832D CLOSED FRACTURE OF DISTAL END OF LEFT FIBULA WITH ROUTINE HEALING, UNSPECIFIED FRACTURE MORPHOLOGY, SUBSEQUENT ENCOUNTER: Primary | ICD-10-CM

## 2025-03-12 DIAGNOSIS — J44.9 CHRONIC OBSTRUCTIVE PULMONARY DISEASE, UNSPECIFIED COPD TYPE (HCC): ICD-10-CM

## 2025-03-12 PROCEDURE — 99204 OFFICE O/P NEW MOD 45 MIN: CPT | Performed by: PODIATRIST

## 2025-03-12 PROCEDURE — 1159F MED LIST DOCD IN RCRD: CPT | Performed by: PODIATRIST

## 2025-03-12 RX ORDER — BUDESONIDE AND FORMOTEROL FUMARATE DIHYDRATE 160; 4.5 UG/1; UG/1
2 AEROSOL RESPIRATORY (INHALATION) 2 TIMES DAILY
Qty: 10.2 G | Refills: 0 | Status: SHIPPED | OUTPATIENT
Start: 2025-03-12

## 2025-03-12 RX ORDER — HYDROCODONE BITARTRATE AND ACETAMINOPHEN 5; 325 MG/1; MG/1
1 TABLET ORAL EVERY 4 HOURS PRN
Qty: 20 TABLET | Refills: 0 | Status: SHIPPED | OUTPATIENT
Start: 2025-03-12 | End: 2025-03-19

## 2025-03-12 NOTE — TELEPHONE ENCOUNTER
Patient has an  appointment today in the Podiatry Depaartment for Left Ankle FX. She was in the Immediate Care on 03/07/2025. [facility] currently in need of a referral, please send including multiple visits.Patient is being seen today.     Future Appointments   Date Time Provider Department Center   3/12/2025  9:30 AM Knight, Jessica Marie, DPM [facility] [place]   3/20/2025 10:40 AM Lb Gould MD [facility] [address]        Thank you

## 2025-03-12 NOTE — PROGRESS NOTES
Reason for Visit      Angela Way is a 69 year old female presents today complaining of left ankle injury.     History of Present Illness     Patient presents to clinic today after being seen in the [facility] center on 3/7/2025 for a left foot and ankle injury.  Patient states 3 days prior she fell while she was walking and landed on her butt.  Patient states her left foot ended up underneath her with a fall and she had initial pain with the inability to bear weight.  Radiographs were taken which noted obliquely minimally displaced fracture of the distal fibula.  Ultrasound was also taken which was negative for DVT.  Patient was placed in a posterior splint and told to follow-up with specialty.  Patient presents to clinic today in a posterior splint and has been walking on it.    The following portions of the patient's history were reviewed and updated as appropriate: allergies, current medications, past family history, past medical history, past social history, past surgical history and problem list.    Allergies[1]      Current Outpatient Medications:     pregabalin 100 MG Oral Cap, Take 1 capsule (100 mg total) by mouth in the morning, at noon, and at bedtime., Disp: 270 capsule, Rfl: 1    ATORVASTATIN 10 MG Oral Tab, TAKE ONE TABLET BY MOUTH ONE TIME NIGHTLY, Disp: 90 tablet, Rfl: 0    SYMBICORT 160-4.5 MCG/ACT Inhalation Aerosol, INHALE 2 PUFFS INTO THE LUNGS 2 (TWO) TIMES DAILY., Disp: 10.2 g, Rfl: 0    ALPRAZolam 0.5 MG Oral Tab, Take 1 tablet (0.5 mg total) by mouth nightly as needed for Anxiety., Disp: 30 tablet, Rfl: 0    HYDROcodone-acetaminophen (NORCO) 5-325 MG Oral Tab, Take 1 tablet by mouth every 6 (six) hours as needed for Pain., Disp: 15 tablet, Rfl: 0    AZELASTINE 0.1 % Nasal Solution, Spray 2 sprays by Nasal route 2 (two) times daily. (Patient not taking: Reported on 10/22/2024), Disp: 30 mL, Rfl: 0    There are no discontinued medications.    Patient Active Problem List   Diagnosis     Generalized anxiety disorder    Bilateral impacted cerumen    Health maintenance examination    Cigarette smoker    Panic attacks    Pure hypercholesterolemia    Prediabetes    Elevated hemoglobin    Chronic obstructive pulmonary disease (HCC)    Overweight (BMI 25.0-29.9)    Herpes zoster with complication       Past Medical History:    Chronic obstructive pulmonary disease (HCC)    Generalized anxiety disorder    Pure hypercholesterolemia    Rheumatic fever in pediatric patient       Past Surgical History:   Procedure Laterality Date             Family History   Problem Relation Age of Onset    Heart Disease Mother     Diabetes Mother     Cancer Father         Liver    Diabetes Father     No Known Problems Sister     No Known Problems Sister     No Known Problems Maternal Grandmother     No Known Problems Maternal Grandfather     No Known Problems Paternal Grandmother     No Known Problems Paternal Grandfather     Breast Cancer Paternal Aunt     Colon Cancer Neg        Social History     Occupational History    Not on file   Tobacco Use    Smoking status: Every Day     Current packs/day: 1.00     Average packs/day: 1 pack/day for 51.2 years (51.2 ttl pk-yrs)     Types: Cigarettes     Start date:     Smokeless tobacco: Never   Vaping Use    Vaping status: Former    Substances: THC, CBD   Substance and Sexual Activity    Alcohol use: Not Currently    Drug use: Yes     Types: Cannabis    Sexual activity: Not Currently     Partners: Male       ROS      Constitutional: negative for chills, fevers and sweats  Gastrointestinal: negative for abdominal pain, diarrhea, nausea and vomiting  Genitourinary:negative for dysuria and hematuria  Musculoskeletal:negative for arthralgias and muscle weakness  Neurological: negative for paresthesia and weakness  All others reviewed and negative.      Physical Exam     LE PHYSICAL EXAM    Constitution: Well-developed and well-nourished. Gait appears normal. No apparent  distress. Alert and oriented to person, place, and time.  Integument: There are no varicosities. Skin appears moist, warm, and supple with positive hair growth. There are no color changes. No open lesions. No macerations, No Hyperkeratotic lesions.  Vascular examination: Dorsalis pedis and posterior tibial pulses are strong bilaterally with capillary filling time less than 3 seconds to all digits. There is no peripheral edema..  Neurological Sensorium: Grossly intact to sharp/dull. Vibratory: Intact.  Musculoskeletal:   5/5 pedal muscle strength b/l     Significant edema and ecchymosis noted to left lower extremity just distal to the fibular head.  No evidence of fracture post wrist compartment syndrome.  Neurovascular status intact level digits 1 through 5 left.  No open lesions noted.      Assessment and Plan     Encounter Diagnoses   Name Primary?    Closed torus fracture of distal end of left fibula, initial encounter Yes   Reviewed radiographs with patient and discussed risk and benefits of her distal fibular fracture.  Discussed that more than likely she sustained some soft tissue injury as well concerned about possible deltoid and syndesmotic injury.  Discussed MRIs for further assessment.  Patient would like to continue conservative care but realizes she needs to be completely nonweightbearing as the more she ambulates the risk of it displacing is significant.  Patient also understands the risk of needing surgery down the road if significant soft tissue structures are damage.  Patient was fitted for a walking boot and told to be nonweightbearing to the left lower extremity for approximately 4 weeks.  Advised her to keep the boot on at all times.  Patient to follow-up in 4 weeks with repeat x-rays and possible transition to weightbearing as tolerated in a walking boot    Patient was instructed to call the office or on-call podiatric physician immediately with any issues or concerns before the next scheduled  visit. Patient to follow-up in clinic in 4 weeks      Kavita Borja DPM, ROSEANNE.AUSTYN US  Diplomat, American Board of Foot and Ankle Surgery  Certified in Foot and Rearfoot/Ankle Reconstruction  Fellow of the American College of Foot and Ankle Surgeons  Fellowship Trained Foot and Ankle Surgeon   [facility]     3/12/2025    6:53 AM         [1]   Allergies  Allergen Reactions    Codeine NAUSEA AND VOMITING

## 2025-03-20 ENCOUNTER — OFFICE VISIT (OUTPATIENT)
Dept: INTERNAL MEDICINE CLINIC | Facility: CLINIC | Age: 70
End: 2025-03-20
Payer: MEDICARE

## 2025-03-20 VITALS
BODY MASS INDEX: 29.32 KG/M2 | OXYGEN SATURATION: 95 % | DIASTOLIC BLOOD PRESSURE: 84 MMHG | TEMPERATURE: 98 F | HEIGHT: 65 IN | WEIGHT: 176 LBS | SYSTOLIC BLOOD PRESSURE: 122 MMHG | HEART RATE: 80 BPM

## 2025-03-20 DIAGNOSIS — D58.2 ELEVATED HEMOGLOBIN: ICD-10-CM

## 2025-03-20 DIAGNOSIS — E78.00 PURE HYPERCHOLESTEROLEMIA: ICD-10-CM

## 2025-03-20 DIAGNOSIS — E66.3 OVERWEIGHT (BMI 25.0-29.9): ICD-10-CM

## 2025-03-20 DIAGNOSIS — Z12.31 SCREENING MAMMOGRAM FOR BREAST CANCER: ICD-10-CM

## 2025-03-20 DIAGNOSIS — B02.29 POST HERPETIC NEURALGIA: ICD-10-CM

## 2025-03-20 DIAGNOSIS — F41.0 PANIC ATTACKS: ICD-10-CM

## 2025-03-20 DIAGNOSIS — H61.23 BILATERAL IMPACTED CERUMEN: ICD-10-CM

## 2025-03-20 DIAGNOSIS — Z00.00 HEALTH MAINTENANCE EXAMINATION: Primary | ICD-10-CM

## 2025-03-20 DIAGNOSIS — R73.03 PREDIABETES: ICD-10-CM

## 2025-03-20 DIAGNOSIS — J44.9 CHRONIC OBSTRUCTIVE PULMONARY DISEASE, UNSPECIFIED COPD TYPE (HCC): ICD-10-CM

## 2025-03-20 DIAGNOSIS — Z78.0 ASYMPTOMATIC MENOPAUSAL STATE: ICD-10-CM

## 2025-03-20 DIAGNOSIS — F41.1 GENERALIZED ANXIETY DISORDER: ICD-10-CM

## 2025-03-20 DIAGNOSIS — S82.832D CLOSED FRACTURE OF DISTAL END OF LEFT FIBULA WITH ROUTINE HEALING, UNSPECIFIED FRACTURE MORPHOLOGY, SUBSEQUENT ENCOUNTER: ICD-10-CM

## 2025-03-20 DIAGNOSIS — F17.210 CIGARETTE SMOKER: ICD-10-CM

## 2025-03-20 NOTE — ASSESSMENT & PLAN NOTE
Recent polycythemia, most likely secondary to smoking and COPD  Needs to stop smoking  Monitor CBC  Will check JAK2 moving forward.

## 2025-03-20 NOTE — TELEPHONE ENCOUNTER
Patient still in need of referral for Podiatry with Dr. EDMUND Galindo.  Future Appointments   Date Time Provider Department Center   3/20/2025 10:40 AM Lb Gould MD [facility] [address]   4/9/2025  1:40 PM Knight, Jessica Marie, DPM [facility] [place]

## 2025-03-20 NOTE — ASSESSMENT & PLAN NOTE
Patient with generalized anxiety disorder.  Also intermittent panic attacks and phobias.  Overall controlled for now.  Has alprazolam to use sparingly as needed for panic attacks-she has alprazolam 0.5 mg as needed #30 to use over the course of 1 year.  Last refill provided 1/20/25 for 1 year supply.

## 2025-03-20 NOTE — ASSESSMENT & PLAN NOTE
Patient with a history of shingles seen in the right posterior hip and down the right leg  Rash is resolved.  Now with post herpetic neuralgia    Continue pregabalin 100 mg 3 times daily  Can also use tylenol and advil as needed for pain.

## 2025-03-20 NOTE — PROGRESS NOTES
FAMILY MEDICINE CLINIC NOTE - MEDICARE    HPI  Angela Way is a 70 year old female presenting for a MA AHA (Medicare Advantage Annual Health Assessment) and Subsequent Annual Wellness visit (Pt already had Initial Annual Wellness).    #Health Maintenance  -Diet: Doesn't eat much - primarily eats one meal a day. Fills up fast. Does eat sweet at night.   -Exercise: Not much  -Lung cancer screen: Indicated - Ordered  -Colon cancer screen: Cologuard 7/10/2023 negative  -Statin:  - 5/2023  -ASA: Not indicated  -Breast cancer screen:  11/2023  -Breast cancer medication to reduce risk: Declines   -Periods: Menopause  -Cervical cancer screen: Not indicated  -DEXA: Indicated - Ordered  -BRCA: Not indicated  -Intimate partner violence: Denies abuse  -HIV screen: Not indicated  -Hep C screen: - 5/2023 negative  -Gonorrhea/chlamydia:  Not Indicated  -Syphillis: Not indicated  -TB: Not indicated  -Tobacco/alcohol: Per below        #Immunizations  -Tdap: Not indicated  -Flu shot: Indicated - declines  -PCV13: Not indicated   -PCV20:  5/2023    -PPSV23: Not indicated   -RZV (preferred) or VZL: Indicated   -RSV: Indicated  -COVID: Indicated      #Post herpetic neuralgia  -valacyclovir - completed  -tried gabapentin 300 mg from her friend, was not helpful  -pregabalin 100 mg 3 times daily  -still uncomfortable, but overall improved    #COPD  -PFT 6/2023  -symbicort 160-4.5 mcg 2 puffs twice daily      #Cigarette smoker  -20 pack year smoking history  -never started the chantix      #Anxiety  -notes phobia of driving as well  -notes white coat syndrome  -notes anxiety at baseline  -can typically talk herself down  -denies depression  -PHQ2 score of 0  -GAD7 score of 7, not difficulty  -no SI/HI  -no AVH  -no significant trauma  -denies symptoms of lane  -has seen therapy in the past  -has alprazolam to use as needed sparingly  -reports anxiety is controlled - has alprazolam #30 for entire year, prescribed 1/2025  -anxiety is  still managed for now     #Hgb   -CBC Hgb 17.0, 16.9    #HLD  -atorvastatin 10 mg nightly  -no issues     #Prediabetes  -Hba1c 5.8     #History of cerumen impaction  -sees Dr Raza intermittently     #L Fibula Fracture  -slipped and fell  -XR L ankle 3/2025 Oblique minimally displaced fracture of the distal fibula extending into the lateral malleolus.   -podiatry Dr Galindo  -in a boot  -using a cane    #Additional screenings  History/Other:   Fall Risk Assessment:   She has been screened for Falls and is low risk.      Cognitive Assessment:   She had a completely normal cognitive assessment - see flowsheet entries       Functional Ability/Status:   Angela Way has a completely normal functional assessment. See flowsheet for details.      Depression Screening (PHQ-2/PHQ-9): PHQ-2 SCORE: 0  , done 3/20/2025        Advanced Directives:   She does have a Living Will but we do NOT have it on file in Epic.    She does have a POA but we do NOT have it on file in [facility].    Patient has Advance Care Planning documents but we do not have a copy in EMR. Discussed Advanced Care Planning with patient and instructed patient to get our office a copy to be scanned into EMR.    #Patient Care Team  Patient Care Team:  Lb Gould MD as PCP - General (Family Medicine)  Kavita Galindo DPM (Surgery, Foot & Ankle)  Chip Raza MD (OTOLARYNGOLOGY)      ROS  GENERAL: No fever/chills, no recent weight loss   HEENT: No visual changes, no changes in hearing, no sore throats  NECK: No pain, no swelling  RESP: No cough, no SOB  CV: No chest pain, no palpitations  GI: No abd pain, no N/V/D  MSK: No edema, no pain  SKIN: No new rashes  NEURO: No numbness, no tingling, no HA    HEALTH MAINTENANCE CHECKLIST  Health Maintenance Topics with due status: Overdue       Topic Date Due    Zoster Vaccines Never done    Lung Cancer Screening Never done    DEXA Scan Never done    COVID-19 Vaccine 09/01/2024    Influenza Vaccine Never done     Mammogram 11/14/2024    Annual Well Visit 01/01/2025    Annual Depression Screening 01/01/2025    Tobacco Cessation Counseling 01/01/2025       ALLERGIES  Allergies[1]    MEDICATIONS  Current Outpatient Medications   Medication Sig Dispense Refill    SYMBICORT 160-4.5 MCG/ACT Inhalation Aerosol INHALE 2 PUFFS INTO THE LUNGS 2 (TWO) TIMES DAILY. 10.2 g 0    pregabalin 100 MG Oral Cap Take 1 capsule (100 mg total) by mouth in the morning, at noon, and at bedtime. 270 capsule 1    ATORVASTATIN 10 MG Oral Tab TAKE ONE TABLET BY MOUTH ONE TIME NIGHTLY 90 tablet 0    ALPRAZolam 0.5 MG Oral Tab Take 1 tablet (0.5 mg total) by mouth nightly as needed for Anxiety. 30 tablet 0    AZELASTINE 0.1 % Nasal Solution Spray 2 sprays by Nasal route 2 (two) times daily. (Patient not taking: Reported on 10/22/2024) 30 mL 0       ACTIVE PROBLEM  Patient Active Problem List   Diagnosis    Generalized anxiety disorder    Bilateral impacted cerumen    Health maintenance examination    Cigarette smoker    Panic attacks    Pure hypercholesterolemia    Prediabetes    Elevated hemoglobin    Chronic obstructive pulmonary disease (HCC)    Overweight (BMI 25.0-29.9)    Post herpetic neuralgia    Closed fracture of distal end of left fibula with routine healing       PAST MEDICAL HISTORY  Past Medical History:    Chronic obstructive pulmonary disease (HCC)    Generalized anxiety disorder    Pure hypercholesterolemia    Rheumatic fever in pediatric patient       PAST SOCIAL HISTORY  Social History     Socioeconomic History    Marital status:      Spouse name: Not on file    Number of children: Not on file    Years of education: Not on file    Highest education level: Not on file   Occupational History    Not on file   Tobacco Use    Smoking status: Every Day     Current packs/day: 1.00     Average packs/day: 1 pack/day for 51.2 years (51.2 ttl pk-yrs)     Types: Cigarettes     Start date: 1974    Smokeless tobacco: Never   Vaping Use     Vaping status: Former    Substances: THC, CBD   Substance and Sexual Activity    Alcohol use: Not Currently    Drug use: Yes     Types: Cannabis    Sexual activity: Not Currently     Partners: Male   Other Topics Concern    Caffeine Concern Not Asked    Exercise Not Asked    Seat Belt Not Asked    Special Diet Not Asked    Stress Concern Not Asked    Weight Concern Not Asked   Social History Narrative    Relationships:  - Jean Paul    Children: Son Ita Wilks (adult)    Pets: Dog - passed away. Got another dog.     School: N/A    Work: Retired - previously a dental .     Origin: Grew up in the [place] area. Parents from [place]    Interests: Shopping - [employer], [employer]. Reading. Cleaning.     Spiritual: Not Baptism, not spiritual         Social Drivers of Health     Food Insecurity: Not on file   Transportation Needs: No Transportation Needs (10/22/2024)    Transportation Needs     Lack of Transportation: No     Car Seat: Not on file   Stress: Not on file   Housing Stability: Not on file       CAGE Alcohol Screen:   CAGE screening score of 0 on 3/20/2025, showing low risk of alcohol abuse.          PAST SURGICAL HISTORY  Past Surgical History:   Procedure Laterality Date             PAST FAMILY HISTORY  Family History   Problem Relation Age of Onset    Heart Disease Mother     Diabetes Mother     Cancer Father         Liver    Diabetes Father     No Known Problems Sister     No Known Problems Sister     No Known Problems Maternal Grandmother     No Known Problems Maternal Grandfather     No Known Problems Paternal Grandmother     No Known Problems Paternal Grandfather     Breast Cancer Paternal Aunt     Colon Cancer Neg        PHYSICAL EXAM  Vitals:    25 1042   BP: 122/84   Pulse: 80   Temp: 97.6 °F (36.4 °C)   SpO2: 95%   Weight: 176 lb (79.8 kg)   Height: 5' 5\" (1.651 m)      Body mass index is 29.29 kg/m².    Medicare Hearing Assessment:  Hearing Screening    Time taken: 3/20/2025  11:19 AM  Entry User: Lb Gould MD  Screening Method: Finger Rub  Finger Rub Result: Pass       Visual Acuity:   Visual Acuity:   Right Eye Visual Acuity: Corrected     Left Eye Visual Acuity: Corrected     Both Eyes Visual Acuity: Corrected Both Eyes Chart Acuity: 20/20   Able To Tolerate Visual Acuity: Yes          GENERAL: NAD  HEENT: Moist mucous membranes, no tonsillar swelling or erythema, PERRLA bilat, TM translucent and non-bulging  NECK: Supple, non-tender  RESP: CTAB, no wheezing, no rales, no rhonchi  CV: RRR, no murmurs  GI: Soft, non-distended, non-tender, no guarding, no rebound, no masses  MSK: No edema. Wearing a boot on the left foot  SKIN: Warm and dry, no rashes  NEURO: Answering questions appropriately    LABS    Lab Results   Component Value Date    WBC 5.3 10/19/2024    HGB 16.9 (H) 10/19/2024    .0 10/19/2024       Lab Results   Component Value Date    AST 19 10/19/2024    ALT 14 10/19/2024    CA 9.7 10/19/2024    ALB 5.1 (H) 10/19/2024    TSH 0.614 05/15/2023    CREATSERUM 0.63 10/19/2024     (H) 10/19/2024       Lab Results   Component Value Date    CHOLEST 166 04/24/2024    HDL 71 04/24/2024    LDL 78 04/24/2024    TRIG 87 04/24/2024         DIAGNOSTICS    ASSESSMENT/PLAN  Problem List Items Addressed This Visit          HCC Problems    Chronic obstructive pulmonary disease (HCC)     Breathing is stable.  Continue Symbicort  Needs to quit smoking         Elevated hemoglobin     Recent polycythemia, most likely secondary to smoking and COPD  Needs to stop smoking  Monitor CBC  Will check JAK2 moving forward.         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  Check A1c          Relevant Orders    Hemoglobin A1C       Neuro    Post herpetic neuralgia     Patient with a history of shingles seen in the right posterior hip and down the right leg  Rash is resolved.  Now with post herpetic neuralgia    Continue pregabalin 100 mg 3 times daily  Can also use tylenol and advil as needed for pain.            Mental Health    Generalized anxiety disorder     Patient with generalized anxiety disorder.  Also intermittent panic attacks and phobias.  Overall controlled for now.  Has alprazolam to use sparingly as needed for panic attacks-she has alprazolam 0.5 mg as needed #30 to use over the course of 1 year.  Last refill provided 1/20/25 for 1 year supply.         Panic attacks     Patient with generalized anxiety disorder.  Also intermittent panic attacks and phobias.  Overall controlled for now.  Has alprazolam to use sparingly as needed for panic attacks-she has alprazolam 0.5 mg as needed #30 to use over the course of 1 year.  Last refill provided 1/20/25 for 1 year supply.            Musculoskeletal and Injuries    Closed fracture of distal end of left fibula with routine healing     Following with podiatry  Has a boot on.  Pain is controlled.            EarNoseThroat    Bilateral impacted cerumen     No issues currently  Desires to regularly follow with ENT for management            Tobacco    Cigarette smoker    Relevant Orders    CT LUNG LD SCREENING(CPT=71271)    Tobacco Use Counseling 3-10 Min [50509]       Health Encounters    Health maintenance examination - Primary     Exercise and diet advised.  Shingles and RSV vaccine-advised to get done at pharmacy at some point  Advanced directive information provided - needs to provide us with copy  Advised COVID vaccine booster.  Mammogram ordered again  DEXA scan ordered  again, reminded to get done.  CT lung screen ordered again, reminded to get done.            Other Visit Diagnoses       Screening mammogram for breast cancer        Relevant Orders    HOLLY KHLOE 2D+3D SCREENING BILAT (CPT=77067/96886)    Asymptomatic menopausal state        Relevant Orders    XR DEXA BONE DENSITOMETRY (CPT=77080)            Return in about 1 year (around 3/20/2026) for physical.    Lb Gould MD  Family Medicine    3/20/2025         Supplementary Documentation:   General Health:  In the past six months, have you lost more than 10 pounds without trying?: 2 - No  Has your appetite been poor?: No  Type of Diet: Balanced  How does the patient maintain a good energy level?: Stretching  How would you describe your daily physical activity?: Light  How would you describe your current health state?: Fair  How do you maintain positive mental well-being?: Social Interaction, Puzzles, Games, Visiting Friends, Visiting Family  On a scale of 0 to 10, with 0 being no pain and 10 being severe pain, what is your pain level?: 4 - (Moderate)  In the past six months, have you experienced urine leakage?: 0-No  At any time do you feel concerned for the safety/well-being of yourself and/or your children, in your home or elsewhere?: No  Have you had any immunizations at another office such as Influenza, Hepatitis B, Tetanus, or Pneumococcal?: No       Anegla Way's SCREENING SCHEDULE   Tests on this list are recommended by your physician but may not be covered, or covered at this frequency, by your insurer.   Please check with your insurance carrier before scheduling to verify coverage.   PREVENTATIVE SERVICES FREQUENCY &  COVERAGE DETAILS LAST COMPLETION DATE   Diabetes Screening    Fasting Blood Sugar /  Glucose    One screening every 12 months if never tested or if previously tested but not diagnosed with pre-diabetes   One screening every 6 months if diagnosed with pre-diabetes Lab Results   Component Value  Date     (H) 10/19/2024        Cardiovascular Disease Screening    Lipid Panel  Cholesterol  Lipoprotein (HDL)  Triglycerides Covered every 5 years for all Medicare beneficiaries without apparent signs or symptoms of cardiovascular disease Lab Results   Component Value Date    CHOLEST 166 04/24/2024    HDL 71 04/24/2024    LDL 78 04/24/2024    TRIG 87 04/24/2024         Electrocardiogram (EKG)   Covered if needed at Welcome to Medicare, and non-screening if indicated for medical reasons -      Ultrasound Screening for Abdominal Aortic Aneurysm (AAA) Covered once in a lifetime for one of the following risk factors    Men who are 65-75 years old and have ever smoked    Anyone with a family history -     Colorectal Cancer Screening  Covered for ages 50-85; only need ONE of the following:    Colonoscopy   Covered every 10 years    Covered every 2 years if patient is at high risk or previous colonoscopy was abnormal -    Health Maintenance   Topic Date Due    Colorectal Cancer Screening  07/10/2026       Flexible Sigmoidoscopy   Covered every 4 years -    Fecal Occult Blood Test Covered annually -   Bone Density Screening    Bone density screening    Covered every 2 years after age 65 if diagnosed with risk of osteoporosis or estrogen deficiency.    Covered yearly for long-term glucocorticoid medication use (Steroids) No results found for this or any previous visit.      Health Maintenance Due   Topic Date Due    DEXA Scan  Never done      Pap and Pelvic    Pap   Covered every 2 years for women at normal risk; Annually if at high risk -  No recommendations at this time    Chlamydia Annually if high risk -  No recommendations at this time   Screening Mammogram    Mammogram     Recommend annually for all female patients aged 40 and older    One baseline mammogram covered for patients aged 35-39 11/14/2023    Health Maintenance   Topic Date Due    Mammogram  11/14/2024       Immunizations    Influenza Covered once  per flu season  Please get every year -  Influenza Vaccine(1) Never done    Pneumococcal Each vaccine (Imrcqer32 & [id number]) covered once after 65 Prevnar 13: -    [id number]: -     No recommendations at this time    Hepatitis B One screening covered for patients with certain risk factors   -  No recommendations at this time    Tetanus Toxoid Not covered by Medicare Part B unless medically necessary (cut with metal); may be covered with your pharmacy prescription benefits -    Tetanus, Diptheria and Pertusis TD and TDaP Not covered by Medicare Part B -  No recommendations at this time    Zoster Not covered by Medicare Part B; may be covered with your pharmacy  prescription benefits -  Zoster Vaccines(1 of 2) Never done     Annual Monitoring of Persistent Medications (ACE/ARB, digoxin diuretics, anticonvulsants)    Potassium Annually Lab Results   Component Value Date    K 3.9 10/19/2024         Creatinine   Annually Lab Results   Component Value Date    CREATSERUM 0.63 10/19/2024         BUN Annually Lab Results   Component Value Date    BUN 9 10/19/2024       Drug Serum Conc Annually No results found for: \"DIGOXIN\", \"DIG\", \"VALP\"           Chronic Obstructive Pulmonary Disease (COPD)    Spirometry Annually Spirometry date: 06/06/2023       Tobacco cessation counseling for <3 minutes.       [1]   Allergies  Allergen Reactions    Codeine NAUSEA AND VOMITING

## 2025-03-20 NOTE — PATIENT INSTRUCTIONS
PATIENT INSTRUCTIONS    Thank you for seeing me today, it was a pleasure taking care of you.  Please check out at the  and schedule a follow up appointment.  Return in about 1 year (around 3/20/2026) for physical.  Please remember that the preferred nona period for appointments is 5 minutes. This is to help maximize the amount of time that we can spend together at our visits.    Please get your labs drawn at your preferred lab.  The following imaging studies were ordered: Mammogram, CT lung, DEXA bone density  Please call [phone] to schedule your imaging appointment.   Please also follow up with the following specialists: Podiatry  Please fill out the advance directive information (power of  documents) and bring a copy to our clinic.  Monitor diet and exercise   Quit smoking    Best,   Dr. Luis Fernando [facility] INFORMATION    Here are some lab and imaging locations available to you. Please note that some of the times and availabilities are subject to change. Please refer to the [facility] webpage for the most recent updates. There are also additional locations that can be found on the [facility] webpage.    To schedule a lab appointment, please call [phone].    To schedule an imaging appointment, please call [phone], option 2      [address] [address]    Lab Hours  Monday - Friday 7:30am - 5pm  Saturday 6:30am - 3pm    X-ray Hours  Call [phone] for hours or to schedule      [facility]  [address] [place] [address]    Lab Hours  Monday - Friday 6:30am - 8pm  Saturday 6:30am - 3pm  Sunday 6:30am - 3pm    X-ray Hours  Call [phone] for hours or to schedule      [facility]  [address] [place] [address]    Lab Hours  Monday - Friday 7:30am - 5pm  Saturday 7:30am - 11:30am    X-ray Hours  None  at this location      [facility] [place]  [address] [place] [address]    Lab Hours  Monday - Friday 8am - 12pm    X-ray Hours  Call [phone] for hours or to schedule      [facility] [place]  [address] [place] [address]    Lab Hours  Monday - Friday 7:30am - 5pm  Saturday 6:30am - 3pm    X-ray Hours  Call [phone] for hours or to schedule      [facility] [place]  [address]  [address]  [place] [address]    Lab Hours  Monday - Friday 7:30am - 4pm (closed 12:15pm - 1pm)    X-ray Hours  Call [phone] for hours or to schedule      [facility] [place]  [address]  [address]  [place] [address]    Lab Hours  Monday - Friday 8am - 4:30pm (closed 12:15pm - 1pm)    X-ray Hours  None at this location        Quitting Smoking    Quitting smoking is the most important step you can take to improve your health. We're glad you have set a goal to improve your health.    Quit Smoking Resources    In addition to medications, use the STAR plan to help you successfully quit.   Stick with your quit date!   Tell friends, family, and coworkers your quit date. Request their understanding and support.  Anticipate and prepare for challenges. Some examples are withdrawal symptoms, being around others who smoke, and drinking alcohol.  Remove all tobacco products and paraphernalia from your environment. Make your home and vehicles smoke-free.    Free resources for additional support:  National tobacco quitline: 1-800-QUIT-NOW (1-247.308.7692).  SmokefreeTXT is a free text program to assist you in quitting. Visit https://www.smokefree.gov/smokefreetxt for more information.  Feel free to call your care manager at ([phone]) for additional support.

## 2025-03-20 NOTE — ASSESSMENT & PLAN NOTE
Exercise and diet advised.  Shingles and RSV vaccine-advised to get done at pharmacy at some point  Advanced directive information provided - needs to provide us with copy  Advised COVID vaccine booster.  Mammogram ordered again  DEXA scan ordered again, reminded to get done.  CT lung screen ordered again, reminded to get done.

## 2025-04-04 ENCOUNTER — LAB ENCOUNTER (OUTPATIENT)
Dept: LAB | Facility: HOSPITAL | Age: 70
End: 2025-04-04
Attending: FAMILY MEDICINE
Payer: MEDICARE

## 2025-04-04 DIAGNOSIS — D58.2 ELEVATED HEMOGLOBIN: ICD-10-CM

## 2025-04-04 LAB
ALBUMIN SERPL-MCNC: 4.6 G/DL (ref 3.2–4.8)
ALBUMIN/GLOB SERPL: 2.1 {RATIO} (ref 1–2)
ALP LIVER SERPL-CCNC: 103 U/L
ALT SERPL-CCNC: 12 U/L
ANION GAP SERPL CALC-SCNC: 7 MMOL/L (ref 0–18)
AST SERPL-CCNC: 15 U/L (ref ?–34)
BASOPHILS # BLD AUTO: 0.05 X10(3) UL (ref 0–0.2)
BASOPHILS NFR BLD AUTO: 0.9 %
BILIRUB SERPL-MCNC: 0.6 MG/DL (ref 0.2–1.1)
BUN BLD-MCNC: 16 MG/DL (ref 9–23)
BUN/CREAT SERPL: 25.4 (ref 10–20)
CALCIUM BLD-MCNC: 9.1 MG/DL (ref 8.7–10.4)
CHLORIDE SERPL-SCNC: 110 MMOL/L (ref 98–112)
CHOLEST SERPL-MCNC: 147 MG/DL (ref ?–200)
CO2 SERPL-SCNC: 27 MMOL/L (ref 21–32)
CREAT BLD-MCNC: 0.63 MG/DL
DEPRECATED RDW RBC AUTO: 44.2 FL (ref 35.1–46.3)
EGFRCR SERPLBLD CKD-EPI 2021: 96 ML/MIN/1.73M2 (ref 60–?)
EOSINOPHIL # BLD AUTO: 0.16 X10(3) UL (ref 0–0.7)
EOSINOPHIL NFR BLD AUTO: 2.8 %
ERYTHROCYTE [DISTWIDTH] IN BLOOD BY AUTOMATED COUNT: 12.9 % (ref 11–15)
EST. AVERAGE GLUCOSE BLD GHB EST-MCNC: 111 MG/DL (ref 68–126)
FASTING PATIENT LIPID ANSWER: YES
FASTING STATUS PATIENT QL REPORTED: YES
GLOBULIN PLAS-MCNC: 2.2 G/DL (ref 2–3.5)
GLUCOSE BLD-MCNC: 89 MG/DL (ref 70–99)
HBA1C MFR BLD: 5.5 % (ref ?–5.7)
HCT VFR BLD AUTO: 45 %
HDLC SERPL-MCNC: 58 MG/DL (ref 40–59)
HGB BLD-MCNC: 15.5 G/DL
IMM GRANULOCYTES # BLD AUTO: 0.05 X10(3) UL (ref 0–1)
IMM GRANULOCYTES NFR BLD: 0.9 %
LDLC SERPL CALC-MCNC: 75 MG/DL (ref ?–100)
LYMPHOCYTES # BLD AUTO: 2.17 X10(3) UL (ref 1–4)
LYMPHOCYTES NFR BLD AUTO: 37.5 %
MCH RBC QN AUTO: 32 PG (ref 26–34)
MCHC RBC AUTO-ENTMCNC: 34.4 G/DL (ref 31–37)
MCV RBC AUTO: 92.8 FL
MONOCYTES # BLD AUTO: 0.59 X10(3) UL (ref 0.1–1)
MONOCYTES NFR BLD AUTO: 10.2 %
NEUTROPHILS # BLD AUTO: 2.77 X10 (3) UL (ref 1.5–7.7)
NEUTROPHILS # BLD AUTO: 2.77 X10(3) UL (ref 1.5–7.7)
NEUTROPHILS NFR BLD AUTO: 47.7 %
NONHDLC SERPL-MCNC: 89 MG/DL (ref ?–130)
OSMOLALITY SERPL CALC.SUM OF ELEC: 299 MOSM/KG (ref 275–295)
PLATELET # BLD AUTO: 229 10(3)UL (ref 150–450)
POTASSIUM SERPL-SCNC: 3.9 MMOL/L (ref 3.5–5.1)
PROT SERPL-MCNC: 6.8 G/DL (ref 5.7–8.2)
RBC # BLD AUTO: 4.85 X10(6)UL
SODIUM SERPL-SCNC: 144 MMOL/L (ref 136–145)
TRIGL SERPL-MCNC: 73 MG/DL (ref 30–149)
VLDLC SERPL CALC-MCNC: 11 MG/DL (ref 0–30)
WBC # BLD AUTO: 5.8 X10(3) UL (ref 4–11)

## 2025-04-04 PROCEDURE — 85025 COMPLETE CBC W/AUTO DIFF WBC: CPT | Performed by: FAMILY MEDICINE

## 2025-04-04 PROCEDURE — 83036 HEMOGLOBIN GLYCOSYLATED A1C: CPT | Performed by: FAMILY MEDICINE

## 2025-04-04 PROCEDURE — 80053 COMPREHEN METABOLIC PANEL: CPT | Performed by: FAMILY MEDICINE

## 2025-04-04 PROCEDURE — 36415 COLL VENOUS BLD VENIPUNCTURE: CPT | Performed by: FAMILY MEDICINE

## 2025-04-04 PROCEDURE — 81270 JAK2 GENE: CPT

## 2025-04-04 PROCEDURE — 80061 LIPID PANEL: CPT | Performed by: FAMILY MEDICINE

## 2025-04-09 ENCOUNTER — OFFICE VISIT (OUTPATIENT)
Dept: PODIATRY CLINIC | Facility: CLINIC | Age: 70
End: 2025-04-09
Payer: MEDICARE

## 2025-04-09 ENCOUNTER — HOSPITAL ENCOUNTER (OUTPATIENT)
Dept: GENERAL RADIOLOGY | Age: 70
Discharge: HOME | End: 2025-04-09
Attending: PODIATRIST
Payer: MEDICARE

## 2025-04-09 DIAGNOSIS — J44.9 CHRONIC OBSTRUCTIVE PULMONARY DISEASE, UNSPECIFIED COPD TYPE (HCC): ICD-10-CM

## 2025-04-09 DIAGNOSIS — S82.822A CLOSED TORUS FRACTURE OF DISTAL END OF LEFT FIBULA, INITIAL ENCOUNTER: ICD-10-CM

## 2025-04-09 DIAGNOSIS — S82.822A CLOSED TORUS FRACTURE OF DISTAL END OF LEFT FIBULA, INITIAL ENCOUNTER: Primary | ICD-10-CM

## 2025-04-09 PROCEDURE — 1159F MED LIST DOCD IN RCRD: CPT | Performed by: PODIATRIST

## 2025-04-09 PROCEDURE — 99214 OFFICE O/P EST MOD 30 MIN: CPT | Performed by: PODIATRIST

## 2025-04-09 PROCEDURE — 1126F AMNT PAIN NOTED NONE PRSNT: CPT | Performed by: PODIATRIST

## 2025-04-09 PROCEDURE — 73610 X-RAY EXAM OF ANKLE: CPT | Performed by: PODIATRIST

## 2025-04-09 RX ORDER — BUDESONIDE AND FORMOTEROL FUMARATE DIHYDRATE 160; 4.5 UG/1; UG/1
2 AEROSOL RESPIRATORY (INHALATION) 2 TIMES DAILY
Qty: 10.2 G | Refills: 0 | Status: SHIPPED | OUTPATIENT
Start: 2025-04-09

## 2025-04-09 NOTE — PROGRESS NOTES
Reason for Visit      Angela Way is a 70 year old female presents today complaining of left ankle injury.     History of Present Illness     Patient presents to clinic today after being seen in the [facility] center on 3/7/2025 for a left foot and ankle injury.  Patient states 3 days prior she fell while she was walking and landed on her butt.  Patient states her left foot ended up underneath her with a fall and she had initial pain with the inability to bear weight.  Radiographs were taken which noted obliquely minimally displaced fracture of the distal fibula.  Ultrasound was also taken which was negative for DVT.  Patient was placed in a posterior splint and told to follow-up with specialty.  Patient presents to clinic today in a posterior splint and has been walking on it.    Patient returns to clinic today for a 3-week follow-up of her left ankle fracture.  Patient returns to clinic today in walking boot.  Patient has no pain at this time.    The following portions of the patient's history were reviewed and updated as appropriate: allergies, current medications, past family history, past medical history, past social history, past surgical history and problem list.    Allergies[1]      Current Outpatient Medications:     SYMBICORT 160-4.5 MCG/ACT Inhalation Aerosol, INHALE 2 PUFFS INTO THE LUNGS 2 (TWO) TIMES DAILY., Disp: 10.2 g, Rfl: 0    pregabalin 100 MG Oral Cap, Take 1 capsule (100 mg total) by mouth in the morning, at noon, and at bedtime., Disp: 270 capsule, Rfl: 1    ATORVASTATIN 10 MG Oral Tab, TAKE ONE TABLET BY MOUTH ONE TIME NIGHTLY, Disp: 90 tablet, Rfl: 0    ALPRAZolam 0.5 MG Oral Tab, Take 1 tablet (0.5 mg total) by mouth nightly as needed for Anxiety., Disp: 30 tablet, Rfl: 0    AZELASTINE 0.1 % Nasal Solution, Spray 2 sprays by Nasal route 2 (two) times daily. (Patient not taking: Reported on 10/22/2024), Disp: 30 mL, Rfl: 0    There are no discontinued medications.    Patient Active  Problem List   Diagnosis    Generalized anxiety disorder    Bilateral impacted cerumen    Health maintenance examination    Cigarette smoker    Panic attacks    Pure hypercholesterolemia    Prediabetes    Elevated hemoglobin    Chronic obstructive pulmonary disease (HCC)    Overweight (BMI 25.0-29.9)    Post herpetic neuralgia    Closed fracture of distal end of left fibula with routine healing       Past Medical History:    Chronic obstructive pulmonary disease (HCC)    Generalized anxiety disorder    Pure hypercholesterolemia    Rheumatic fever in pediatric patient       Past Surgical History:   Procedure Laterality Date             Family History   Problem Relation Age of Onset    Heart Disease Mother     Diabetes Mother     Cancer Father         Liver    Diabetes Father     No Known Problems Sister     No Known Problems Sister     No Known Problems Maternal Grandmother     No Known Problems Maternal Grandfather     No Known Problems Paternal Grandmother     No Known Problems Paternal Grandfather     Breast Cancer Paternal Aunt     Colon Cancer Neg        Social History     Occupational History    Not on file   Tobacco Use    Smoking status: Every Day     Current packs/day: 1.00     Average packs/day: 1 pack/day for 51.3 years (51.3 ttl pk-yrs)     Types: Cigarettes     Start date:     Smokeless tobacco: Never   Vaping Use    Vaping status: Former    Substances: THC, CBD   Substance and Sexual Activity    Alcohol use: Not Currently    Drug use: Yes     Types: Cannabis    Sexual activity: Not Currently     Partners: Male       ROS      Constitutional: negative for chills, fevers and sweats  Gastrointestinal: negative for abdominal pain, diarrhea, nausea and vomiting  Genitourinary:negative for dysuria and hematuria  Musculoskeletal:negative for arthralgias and muscle weakness  Neurological: negative for paresthesia and weakness  All others reviewed and negative.      Physical Exam     LE PHYSICAL  EXAM    Constitution: Well-developed and well-nourished. Gait appears normal. No apparent distress. Alert and oriented to person, place, and time.  Integument: There are no varicosities. Skin appears moist, warm, and supple with positive hair growth. There are no color changes. No open lesions. No macerations, No Hyperkeratotic lesions.  Vascular examination: Dorsalis pedis and posterior tibial pulses are strong bilaterally with capillary filling time less than 3 seconds to all digits. There is no peripheral edema..  Neurological Sensorium: Grossly intact to sharp/dull. Vibratory: Intact.  Musculoskeletal:   5/5 pedal muscle strength b/l     Mild edema to the left ankle with no pain on palpation to the distal fibula.  Negative anterior drawer talar tilt test.  No pain with dorsiflexion plantarflexion inversion eversion against resistance.  No evidence of fracture post wrist compartment syndrome.  Neurovascular status intact level digits 1 through 5 left.  No open lesions noted.      Assessment and Plan     Encounter Diagnoses   Name Primary?    Closed torus fracture of distal end of left fibula, initial encounter Yes     Reviewed radiographs with patient and discussed risk and benefits of her distal fibular fracture.  Discussed that more than likely she sustained some soft tissue injury as well concerned about possible deltoid and syndesmotic injury.  Discussed MRIs for further assessment.  Patient would like to continue conservative care but realizes she needs to be completely nonweightbearing as the more she ambulates the risk of it displacing is significant.  Patient also understands the risk of needing surgery down the road if significant soft tissue structures are damage.      Reviewed radiographs in great detail which noted bony bridging across the fracture site.  Patient can now start to transition slowly out of the walking boot into a an ankle brace and supportive shoe.  Patients should still limited her  ambulation to only necessary activities at this point patient to follow-up at the 8-week [facility] for repeat x-rays and discussion of physical therapy    Patient was instructed to call the office or on-call podiatric physician immediately with any issues or concerns before the next scheduled visit. Patient to follow-up in clinic in 4 weeks      Kavita Borja DPM, ROSEANNE.AUSTYN US  Diplomat, American Board of Foot and Ankle Surgery  Certified in Foot and Rearfoot/Ankle Reconstruction  Fellow of the American College of Foot and Ankle Surgeons  Fellowship Trained Foot and Ankle Surgeon   [facility]     3/12/2025    6:53 AM         [1]   Allergies  Allergen Reactions    Codeine NAUSEA AND VOMITING

## 2025-04-16 ENCOUNTER — PATIENT MESSAGE (OUTPATIENT)
Dept: INTERNAL MEDICINE CLINIC | Facility: CLINIC | Age: 70
End: 2025-04-16

## 2025-05-06 ENCOUNTER — OFFICE VISIT (OUTPATIENT)
Dept: PODIATRY CLINIC | Facility: CLINIC | Age: 70
End: 2025-05-06

## 2025-05-06 ENCOUNTER — TELEPHONE (OUTPATIENT)
Dept: PODIATRY CLINIC | Facility: CLINIC | Age: 70
End: 2025-05-06

## 2025-05-06 ENCOUNTER — HOSPITAL ENCOUNTER (OUTPATIENT)
Dept: GENERAL RADIOLOGY | Age: 70
Discharge: HOME | End: 2025-05-06
Attending: PODIATRIST
Payer: MEDICARE

## 2025-05-06 DIAGNOSIS — S82.822A CLOSED TORUS FRACTURE OF DISTAL END OF LEFT FIBULA, INITIAL ENCOUNTER: Primary | ICD-10-CM

## 2025-05-06 DIAGNOSIS — S82.822A CLOSED TORUS FRACTURE OF DISTAL END OF LEFT FIBULA, INITIAL ENCOUNTER: ICD-10-CM

## 2025-05-06 PROCEDURE — 99214 OFFICE O/P EST MOD 30 MIN: CPT | Performed by: PODIATRIST

## 2025-05-06 PROCEDURE — 1126F AMNT PAIN NOTED NONE PRSNT: CPT | Performed by: PODIATRIST

## 2025-05-06 PROCEDURE — 1159F MED LIST DOCD IN RCRD: CPT | Performed by: PODIATRIST

## 2025-05-06 PROCEDURE — 73610 X-RAY EXAM OF ANKLE: CPT | Performed by: PODIATRIST

## 2025-05-06 RX ORDER — METHYLPREDNISOLONE 4 MG/1
TABLET ORAL
Qty: 21 TABLET | Refills: 0 | Status: SHIPPED | OUTPATIENT
Start: 2025-05-06

## 2025-05-06 NOTE — PROGRESS NOTES
Reason for Visit      Angela Way is a 70 year old female presents today complaining of left ankle injury.     History of Present Illness     Patient presents to clinic today after being seen in the [facility] center on 3/7/2025 for a left foot and ankle injury.  Patient states 3 days prior she fell while she was walking and landed on her butt.  Patient states her left foot ended up underneath her with a fall and she had initial pain with the inability to bear weight.  Radiographs were taken which noted obliquely minimally displaced fracture of the distal fibula.  Ultrasound was also taken which was negative for DVT.  Patient was placed in a posterior splint and told to follow-up with specialty.  Patient presents to clinic today in a posterior splint and has been walking on it.    Patient returns to clinic today for a 3-week follow-up of her left ankle fracture.  Patient returns to clinic today in slip on shoe with no support.  Patient states she has no pain only swelling by the end of the day.      The following portions of the patient's history were reviewed and updated as appropriate: allergies, current medications, past family history, past medical history, past social history, past surgical history and problem list.    Allergies[1]      Current Outpatient Medications:     SYMBICORT 160-4.5 MCG/ACT Inhalation Aerosol, INHALE 2 PUFFS INTO THE LUNGS 2 (TWO) TIMES DAILY., Disp: 10.2 g, Rfl: 0    pregabalin 100 MG Oral Cap, Take 1 capsule (100 mg total) by mouth in the morning, at noon, and at bedtime., Disp: 270 capsule, Rfl: 1    ATORVASTATIN 10 MG Oral Tab, TAKE ONE TABLET BY MOUTH ONE TIME NIGHTLY, Disp: 90 tablet, Rfl: 0    ALPRAZolam 0.5 MG Oral Tab, Take 1 tablet (0.5 mg total) by mouth nightly as needed for Anxiety., Disp: 30 tablet, Rfl: 0    AZELASTINE 0.1 % Nasal Solution, Spray 2 sprays by Nasal route 2 (two) times daily. (Patient not taking: Reported on 5/6/2025), Disp: 30 mL, Rfl: 0    There  are no discontinued medications.    Patient Active Problem List   Diagnosis    Generalized anxiety disorder    Bilateral impacted cerumen    Health maintenance examination    Cigarette smoker    Panic attacks    Pure hypercholesterolemia    Prediabetes    Elevated hemoglobin    Chronic obstructive pulmonary disease (HCC)    Overweight (BMI 25.0-29.9)    Post herpetic neuralgia    Closed fracture of distal end of left fibula with routine healing       Past Medical History:    Chronic obstructive pulmonary disease (HCC)    Generalized anxiety disorder    Pure hypercholesterolemia    Rheumatic fever in pediatric patient       Past Surgical History:   Procedure Laterality Date             Family History   Problem Relation Age of Onset    Heart Disease Mother     Diabetes Mother     Cancer Father         Liver    Diabetes Father     No Known Problems Sister     No Known Problems Sister     No Known Problems Maternal Grandmother     No Known Problems Maternal Grandfather     No Known Problems Paternal Grandmother     No Known Problems Paternal Grandfather     Breast Cancer Paternal Aunt     Colon Cancer Neg        Social History     Occupational History    Not on file   Tobacco Use    Smoking status: Every Day     Current packs/day: 1.00     Average packs/day: 1 pack/day for 51.3 years (51.3 ttl pk-yrs)     Types: Cigarettes     Start date:     Smokeless tobacco: Never   Vaping Use    Vaping status: Former    Substances: THC, CBD   Substance and Sexual Activity    Alcohol use: Not Currently    Drug use: Yes     Types: Cannabis    Sexual activity: Not Currently     Partners: Male       ROS      Constitutional: negative for chills, fevers and sweats  Gastrointestinal: negative for abdominal pain, diarrhea, nausea and vomiting  Genitourinary:negative for dysuria and hematuria  Musculoskeletal:negative for arthralgias and muscle weakness  Neurological: negative for paresthesia and weakness  All others reviewed and  negative.      Physical Exam     LE PHYSICAL EXAM    Constitution: Well-developed and well-nourished. Gait appears normal. No apparent distress. Alert and oriented to person, place, and time.  Integument: There are no varicosities. Skin appears moist, warm, and supple with positive hair growth. There are no color changes. No open lesions. No macerations, No Hyperkeratotic lesions.  Vascular examination: Dorsalis pedis and posterior tibial pulses are strong bilaterally with capillary filling time less than 3 seconds to all digits. There is no peripheral edema..  Neurological Sensorium: Grossly intact to sharp/dull. Vibratory: Intact.  Musculoskeletal:   5/5 pedal muscle strength b/l     Mild edema to the left ankle with no pain on palpation to the distal fibula.  Negative anterior drawer talar tilt test.  No pain with dorsiflexion plantarflexion inversion eversion against resistance.  No evidence of fracture post wrist compartment syndrome.  Neurovascular status intact level digits 1 through 5 left.  No open lesions noted.      Assessment and Plan     No diagnosis found.    Reviewed radiographs with patient and discussed risk and benefits of her distal fibular fracture.  Discussed that more than likely she sustained some soft tissue injury as well concerned about possible deltoid and syndesmotic injury.  Discussed MRIs for further assessment.  Patient would like to continue conservative care but realizes she needs to be completely nonweightbearing as the more she ambulates the risk of it displacing is significant.  Patient also understands the risk of needing surgery down the road if significant soft tissue structures are damage.      Reviewed radiographs in great detail which noted bony bridging across the fracture site.    Patient has no pain at this time but still some swelling at the end of the day.  Discussed physical therapy and patient declined.  Discussed vitamin D levels as she still not completely healed  patient has no pain and declined.  Patient's primary concern is the porokeratosis subfourth metatarsal head of her left foot.  Discussed cryotherapy and great detail.    Patient was instructed to call the office or on-call podiatric physician immediately with any issues or concerns before the next scheduled visit. Patient to follow-up in clinic in 4 weeks      Kavita Borja DPM, ROSEANNE.MAGEN, FACFAS  Diplomat, American Board of Foot and Ankle Surgery  Certified in Foot and Rearfoot/Ankle Reconstruction  Fellow of the American College of Foot and Ankle Surgeons  Fellowship Trained Foot and Ankle Surgeon   [facility]     3/12/2025    6:53 AM         [1]   Allergies  Allergen Reactions    Codeine NAUSEA AND VOMITING

## 2025-05-06 NOTE — TELEPHONE ENCOUNTER
Left msg for the patient to come in earlier for x-rays. Order entered by Dr Galindo . Phone # provided for questions and clarifications if needed. Yas Quigley

## 2025-05-07 DIAGNOSIS — J44.9 CHRONIC OBSTRUCTIVE PULMONARY DISEASE, UNSPECIFIED COPD TYPE (HCC): ICD-10-CM

## 2025-05-07 RX ORDER — BUDESONIDE AND FORMOTEROL FUMARATE DIHYDRATE 160; 4.5 UG/1; UG/1
2 AEROSOL RESPIRATORY (INHALATION) 2 TIMES DAILY
Qty: 10.2 G | Refills: 0 | Status: SHIPPED | OUTPATIENT
Start: 2025-05-07

## 2025-05-24 DIAGNOSIS — E78.00 PURE HYPERCHOLESTEROLEMIA: ICD-10-CM

## 2025-05-27 RX ORDER — ATORVASTATIN CALCIUM 10 MG/1
10 TABLET, FILM COATED ORAL NIGHTLY
Qty: 90 TABLET | Refills: 3 | Status: SHIPPED | OUTPATIENT
Start: 2025-05-27

## 2025-05-29 ENCOUNTER — HOSPITAL ENCOUNTER (OUTPATIENT)
Dept: BONE DENSITY | Age: 70
Discharge: HOME | End: 2025-05-29
Attending: FAMILY MEDICINE
Payer: MEDICARE

## 2025-05-29 DIAGNOSIS — Z78.0 ASYMPTOMATIC MENOPAUSAL STATE: ICD-10-CM

## 2025-05-29 PROCEDURE — 77080 DXA BONE DENSITY AXIAL: CPT | Performed by: FAMILY MEDICINE

## 2025-06-02 ENCOUNTER — PATIENT MESSAGE (OUTPATIENT)
Dept: INTERNAL MEDICINE CLINIC | Facility: CLINIC | Age: 70
End: 2025-06-02

## 2025-06-02 DIAGNOSIS — B02.8 HERPES ZOSTER WITH COMPLICATION: ICD-10-CM

## 2025-06-02 DIAGNOSIS — M80.00XD AGE-RELATED OSTEOPOROSIS WITH CURRENT PATHOLOGICAL FRACTURE WITH ROUTINE HEALING: Primary | ICD-10-CM

## 2025-06-02 RX ORDER — PREGABALIN 100 MG/1
100 CAPSULE ORAL 3 TIMES DAILY
Qty: 270 CAPSULE | Refills: 1 | Status: SHIPPED | OUTPATIENT
Start: 2025-06-02

## 2025-06-04 DIAGNOSIS — J44.9 CHRONIC OBSTRUCTIVE PULMONARY DISEASE, UNSPECIFIED COPD TYPE (HCC): ICD-10-CM

## 2025-06-04 RX ORDER — BUDESONIDE AND FORMOTEROL FUMARATE DIHYDRATE 160; 4.5 UG/1; UG/1
2 AEROSOL RESPIRATORY (INHALATION) 2 TIMES DAILY
Qty: 30.6 G | Refills: 3 | Status: SHIPPED | OUTPATIENT
Start: 2025-06-04

## 2025-06-04 NOTE — TELEPHONE ENCOUNTER
Refill passed per [facility] protocols.    Requested Prescriptions   Pending Prescriptions Disp Refills    SYMBICORT 160-4.5 MCG/ACT Inhalation Aerosol [Pharmacy Med Name: Symbicort 160-4.5 Mcg/Act Aer Astr] 30.6 g 3     Sig: INHALE 2 PUFFS INTO THE LUNGS 2 (TWO) TIMES DAILY.       Asthma & COPD Medication Protocol Passed - 6/4/2025  6:59 PM

## 2025-06-23 ENCOUNTER — HOSPITAL ENCOUNTER (OUTPATIENT)
Dept: MAMMOGRAPHY | Age: 70
Discharge: HOME | End: 2025-06-23
Attending: FAMILY MEDICINE
Payer: MEDICARE

## 2025-06-23 DIAGNOSIS — Z12.31 SCREENING MAMMOGRAM FOR BREAST CANCER: ICD-10-CM

## 2025-06-23 PROCEDURE — 77063 BREAST TOMOSYNTHESIS BI: CPT | Performed by: FAMILY MEDICINE

## 2025-06-23 PROCEDURE — 77067 SCR MAMMO BI INCL CAD: CPT | Performed by: FAMILY MEDICINE

## 2025-06-28 DIAGNOSIS — E78.00 PURE HYPERCHOLESTEROLEMIA: ICD-10-CM

## 2025-07-01 RX ORDER — ATORVASTATIN CALCIUM 10 MG/1
10 TABLET, FILM COATED ORAL NIGHTLY
Qty: 90 TABLET | Refills: 3 | Status: SHIPPED | OUTPATIENT
Start: 2025-07-01

## 2025-07-09 ENCOUNTER — TELEPHONE (OUTPATIENT)
Dept: PODIATRY CLINIC | Facility: CLINIC | Age: 70
End: 2025-07-09

## 2025-07-09 ENCOUNTER — OFFICE VISIT (OUTPATIENT)
Dept: PODIATRY CLINIC | Facility: CLINIC | Age: 70
End: 2025-07-09
Payer: MEDICARE

## 2025-07-09 ENCOUNTER — HOSPITAL ENCOUNTER (OUTPATIENT)
Dept: GENERAL RADIOLOGY | Age: 70
Discharge: HOME | End: 2025-07-09
Attending: PODIATRIST
Payer: MEDICARE

## 2025-07-09 VITALS — DIASTOLIC BLOOD PRESSURE: 64 MMHG | HEART RATE: 76 BPM | SYSTOLIC BLOOD PRESSURE: 100 MMHG

## 2025-07-09 DIAGNOSIS — S82.822A CLOSED TORUS FRACTURE OF DISTAL END OF LEFT FIBULA, INITIAL ENCOUNTER: ICD-10-CM

## 2025-07-09 DIAGNOSIS — B07.0 PLANTAR VERRUCA: Primary | ICD-10-CM

## 2025-07-09 DIAGNOSIS — S82.822A CLOSED TORUS FRACTURE OF DISTAL END OF LEFT FIBULA, INITIAL ENCOUNTER: Primary | ICD-10-CM

## 2025-07-09 PROCEDURE — 73610 X-RAY EXAM OF ANKLE: CPT | Performed by: PODIATRIST

## 2025-07-09 PROCEDURE — 3078F DIAST BP <80 MM HG: CPT | Performed by: PODIATRIST

## 2025-07-09 PROCEDURE — 1125F AMNT PAIN NOTED PAIN PRSNT: CPT | Performed by: PODIATRIST

## 2025-07-09 PROCEDURE — 1159F MED LIST DOCD IN RCRD: CPT | Performed by: PODIATRIST

## 2025-07-09 PROCEDURE — 3074F SYST BP LT 130 MM HG: CPT | Performed by: PODIATRIST

## 2025-07-09 PROCEDURE — 17111 DESTRUCTION B9 LESIONS 15/>: CPT | Performed by: PODIATRIST

## 2025-07-09 NOTE — PROGRESS NOTES
Reason for Visit      Angela Way is a 70 year old female presents today complaining of left ankle injury.     History of Present Illness     Patient presents to clinic today after being seen in the [facility] center on 3/7/2025 for a left foot and ankle injury.  Patient states 3 days prior she fell while she was walking and landed on her butt.  Patient states her left foot ended up underneath her with a fall and she had initial pain with the inability to bear weight.  Radiographs were taken which noted obliquely minimally displaced fracture of the distal fibula.  Ultrasound was also taken which was negative for DVT.  Patient was placed in a posterior splint and told to follow-up with specialty.  Patient presents to clinic today in a posterior splint and has been walking on it.    Patient returns to clinic today for a 2-month follow-up of a left fibular fracture.  Patient returns to clinic today for cryotherapy of the left foot      The following portions of the patient's history were reviewed and updated as appropriate: allergies, current medications, past family history, past medical history, past social history, past surgical history and problem list.    Allergies[1]      Current Outpatient Medications:     atorvastatin 10 MG Oral Tab, Take 1 tablet (10 mg total) by mouth nightly., Disp: 90 tablet, Rfl: 3    Budesonide-Formoterol Fumarate (SYMBICORT) 160-4.5 MCG/ACT Inhalation Aerosol, Inhale 2 puffs into the lungs 2 (two) times daily., Disp: 30.6 g, Rfl: 3    pregabalin 100 MG Oral Cap, Take 1 capsule (100 mg total) by mouth in the morning, at noon, and at bedtime., Disp: 270 capsule, Rfl: 1    methylPREDNISolone 4 MG Oral Tablet Therapy Pack, Take per package insert (instructions). Take as directed on the box, Disp: 21 tablet, Rfl: 0    ALPRAZolam 0.5 MG Oral Tab, Take 1 tablet (0.5 mg total) by mouth nightly as needed for Anxiety., Disp: 30 tablet, Rfl: 0    AZELASTINE 0.1 % Nasal Solution, Spray 2  sprays by Nasal route 2 (two) times daily. (Patient not taking: Reported on 2025), Disp: 30 mL, Rfl: 0    There are no discontinued medications.    Patient Active Problem List   Diagnosis    Generalized anxiety disorder    Bilateral impacted cerumen    Health maintenance examination    Cigarette smoker    Panic attacks    Pure hypercholesterolemia    Prediabetes    Elevated hemoglobin    Chronic obstructive pulmonary disease (HCC)    Overweight (BMI 25.0-29.9)    Post herpetic neuralgia    Closed fracture of distal end of left fibula with routine healing    Age-related osteoporosis with current pathological fracture with routine healing       Past Medical History:    Chronic obstructive pulmonary disease (HCC)    Generalized anxiety disorder    Pure hypercholesterolemia    Rheumatic fever in pediatric patient       Past Surgical History:   Procedure Laterality Date             Family History   Problem Relation Age of Onset    Heart Disease Mother     Diabetes Mother     Cancer Father         Liver    Diabetes Father     No Known Problems Sister     No Known Problems Sister     No Known Problems Maternal Grandmother     No Known Problems Maternal Grandfather     No Known Problems Paternal Grandmother     No Known Problems Paternal Grandfather     Breast Cancer Paternal Aunt     Colon Cancer Neg     Ovarian Cancer Neg     Prostate Cancer Neg     Pancreatic Cancer Neg     Uterine Cancer Neg        Social History     Occupational History    Not on file   Tobacco Use    Smoking status: Every Day     Current packs/day: 1.00     Average packs/day: 1 pack/day for 51.5 years (51.5 ttl pk-yrs)     Types: Cigarettes     Start date:     Smokeless tobacco: Never   Vaping Use    Vaping status: Former    Substances: THC, CBD   Substance and Sexual Activity    Alcohol use: Not Currently    Drug use: Yes     Types: Cannabis    Sexual activity: Not Currently     Partners: Male       ROS      Constitutional: negative for  chills, fevers and sweats  Gastrointestinal: negative for abdominal pain, diarrhea, nausea and vomiting  Genitourinary:negative for dysuria and hematuria  Musculoskeletal:negative for arthralgias and muscle weakness  Neurological: negative for paresthesia and weakness  All others reviewed and negative.      Physical Exam     LE PHYSICAL EXAM    Constitution: Well-developed and well-nourished. Gait appears normal. No apparent distress. Alert and oriented to person, place, and time.  Integument: There are no varicosities. Skin appears moist, warm, and supple with positive hair growth. There are no color changes. No open lesions. No macerations, No Hyperkeratotic lesions.  Vascular examination: Dorsalis pedis and posterior tibial pulses are strong bilaterally with capillary filling time less than 3 seconds to all digits. There is no peripheral edema..  Neurological Sensorium: Grossly intact to sharp/dull. Vibratory: Intact.  Musculoskeletal:   5/5 pedal muscle strength b/l     Mild edema to the left ankle with no pain on palpation to the distal fibula.  Negative anterior drawer talar tilt test.  No pain with dorsiflexion plantarflexion inversion eversion against resistance.  No evidence of fracture post wrist compartment syndrome.  Neurovascular status intact level digits 1 through 5 left.  No open lesions noted.    PROCEDURE:  Procedure: (51204) Verrucca Cryotherapy Sx < 15 lesions   Discussed the etiology of plantar verruca and that this is of viral origin.   Reviewed the various treatment options including topical medications, freezing agents, cauterization and surgical excision.  Discussed warts are due to a virus.  Discussed potential for return specifically to prior location.  Verruca lesions were sharply pared with #15 blade to pinpoint bleeding.  Pt elects conservative treatment as listed below:  Liquid nitrogen x 10 seconds was applied and covered with an occlusive dressing.        Assessment and Plan      Encounter Diagnoses   Name Primary?    Plantar verruca Yes    Closed torus fracture of distal end of left fibula, initial encounter        Reviewed radiographs with patient and discussed risk and benefits of her distal fibular fracture.  Discussed that more than likely she sustained some soft tissue injury as well concerned about possible deltoid and syndesmotic injury.  Discussed MRIs for further assessment.  Patient would like to continue conservative care but realizes she needs to be completely nonweightbearing as the more she ambulates the risk of it displacing is significant.  Patient also understands the risk of needing surgery down the road if significant soft tissue structures are damage.      Reviewed radiographs in great detail which noted bony bridging across the fracture site.    Patient has no pain at this time but still some swelling at the end of the day.  Discussed physical therapy and patient declined.  Discussed vitamin D levels as she still not completely healed patient has no pain and declined.  Patient's primary concern is the porokeratosis subfourth metatarsal head of her left foot.  Discussed cryotherapy and great detail.    Patient was instructed to call the office or on-call podiatric physician immediately with any issues or concerns before the next scheduled visit. Patient to follow-up in clinic in 4 weeks      Kavita Borja DPM, ROSEANNE.AUSTYN US  Diplomat, American Board of Foot and Ankle Surgery  Certified in Foot and Rearfoot/Ankle Reconstruction  Fellow of the American College of Foot and Ankle Surgeons  Fellowship Trained Foot and Ankle Surgeon   [facility]     3/12/2025    6:53 AM         [1]   Allergies  Allergen Reactions    Codeine NAUSEA AND VOMITING

## 2025-08-06 ENCOUNTER — OFFICE VISIT (OUTPATIENT)
Dept: PODIATRY CLINIC | Facility: CLINIC | Age: 70
End: 2025-08-06

## 2025-08-06 DIAGNOSIS — B07.0 PLANTAR VERRUCA: Primary | ICD-10-CM

## 2025-08-06 PROCEDURE — 17110 DESTRUCTION B9 LES UP TO 14: CPT | Performed by: PODIATRIST

## 2025-08-06 PROCEDURE — 1159F MED LIST DOCD IN RCRD: CPT | Performed by: PODIATRIST

## (undated) NOTE — Clinical Note
Initial assessment completed with patient.  Appointment scheduled for 10/29/24.  Patient agrees to additional follow-up calls from nurse care manager.  Thank you!